# Patient Record
Sex: MALE | Race: WHITE | NOT HISPANIC OR LATINO | Employment: FULL TIME | URBAN - METROPOLITAN AREA
[De-identification: names, ages, dates, MRNs, and addresses within clinical notes are randomized per-mention and may not be internally consistent; named-entity substitution may affect disease eponyms.]

---

## 2017-11-22 PROCEDURE — 99283 EMERGENCY DEPT VISIT LOW MDM: CPT

## 2017-11-22 ASSESSMENT — PAIN SCALES - GENERAL
PAINLEVEL_OUTOF10: 4
PAINLEVEL_OUTOF10: 4

## 2017-11-23 ENCOUNTER — HOSPITAL ENCOUNTER (EMERGENCY)
Facility: MEDICAL CENTER | Age: 22
End: 2017-11-23
Attending: EMERGENCY MEDICINE
Payer: MEDICAID

## 2017-11-23 ENCOUNTER — APPOINTMENT (OUTPATIENT)
Dept: RADIOLOGY | Facility: MEDICAL CENTER | Age: 22
End: 2017-11-23
Attending: STUDENT IN AN ORGANIZED HEALTH CARE EDUCATION/TRAINING PROGRAM
Payer: MEDICAID

## 2017-11-23 VITALS
OXYGEN SATURATION: 95 % | DIASTOLIC BLOOD PRESSURE: 70 MMHG | WEIGHT: 234.57 LBS | BODY MASS INDEX: 32.84 KG/M2 | TEMPERATURE: 97.4 F | HEIGHT: 71 IN | SYSTOLIC BLOOD PRESSURE: 144 MMHG | RESPIRATION RATE: 18 BRPM | HEART RATE: 75 BPM

## 2017-11-23 DIAGNOSIS — N50.811 PAIN IN RIGHT TESTICLE: ICD-10-CM

## 2017-11-23 LAB
APPEARANCE UR: CLEAR
BILIRUB UR QL STRIP.AUTO: NEGATIVE
COLOR UR: YELLOW
CULTURE IF INDICATED INDCX: NO UA CULTURE
GLUCOSE UR STRIP.AUTO-MCNC: NEGATIVE MG/DL
KETONES UR STRIP.AUTO-MCNC: NEGATIVE MG/DL
LEUKOCYTE ESTERASE UR QL STRIP.AUTO: NEGATIVE
MICRO URNS: NORMAL
NITRITE UR QL STRIP.AUTO: NEGATIVE
PH UR STRIP.AUTO: 6.5 [PH]
PROT UR QL STRIP: NEGATIVE MG/DL
RBC UR QL AUTO: NEGATIVE
SP GR UR STRIP.AUTO: 1.02
UROBILINOGEN UR STRIP.AUTO-MCNC: 0.2 MG/DL

## 2017-11-23 PROCEDURE — 76870 US EXAM SCROTUM: CPT

## 2017-11-23 PROCEDURE — 81003 URINALYSIS AUTO W/O SCOPE: CPT

## 2017-11-23 NOTE — ED NOTES
"Chief Complaint   Patient presents with   • Testicle Pain     right testicle; pt states s/s started about year and half but past week pain has increased. Pt denies any discoloration or swelling.      Pt ambulatory to triage with above complaint. Pt states pain is usually a constant dull ache, with intermittent sharp pains.     /70   Pulse 92   Temp 36.2 °C (97.2 °F) (Temporal)   Resp 18   Ht 1.803 m (5' 11\")   Wt 106.4 kg (234 lb 9.1 oz)   SpO2 100%   BMI 32.72 kg/m²     Pt informed of triage process and encouraged to notify staff of any changes or concerns. Pt placed back in lobby.  "

## 2017-11-23 NOTE — ED NOTES
Reviewed discharge instructions, pt verbalized understanding of instructions. States he will schedule follow-up appointment with urology for cont pain. Denies further questions at this time. Pt ambulatory out of ER with stable gait.

## 2017-11-23 NOTE — ED PROVIDER NOTES
"CHIEF COMPLAINT  Chief Complaint   Patient presents with   • Testicle Pain     right testicle; pt states s/s started about year and half but past week pain has increased. Pt denies any discoloration or swelling.        HPI  Demar Gomez is a 22 y.o. male who presents with right testicular pain. He reports intermittent right testicular pain which last about 30 minutes for 1-1/2 year but has been recently worsening for the last week with dull aching pain in between the episodes, and the pain is usually worse with standing and sitting for long time. He denies scrotal swelling, fevers, nausea, vomiting, abdominal pain, loss of appetite or weight loss, penile discharge, burning micturition, frequency and urgency. But he acknowledges low sexual drive especially for the last month. The scrotal exam was negative for hydrocele, hernia, there might be small bilateral varicoceles.    REVIEW OF SYSTEMS  See HPI for further details. All other systems are negative.     PAST MEDICAL HISTORY       SOCIAL HISTORY  Social History     Social History Main Topics   • Smoking status: Not on file   • Smokeless tobacco: Not on file   • Alcohol use Not on file   • Drug use: Unknown   • Sexual activity: Not on file       SURGICAL HISTORY  patient denies any surgical history    CURRENT MEDICATIONS  Home Medications    **Home medications have not yet been reviewed for this encounter**         ALLERGIES  No Known Allergies    PHYSICAL EXAM  VITAL SIGNS: /70   Pulse 92   Temp 36.2 °C (97.2 °F) (Temporal)   Resp 18   Ht 1.803 m (5' 11\")   Wt 106.4 kg (234 lb 9.1 oz)   SpO2 100%   BMI 32.72 kg/m²   Pulse ox interpretation: I interpret this pulse ox as normal.  Constitutional: Alert in no apparent distress.  Cardiovascular: Regular rate and rhythm  Thorax & Lungs: No respiratory distress  Abdomen: Bowel sounds normal, Soft, No tenderness, No masses, No pulsatile masses. No peritoneal signs.  Skin: Warm, Dry, No erythema, No " "rash.   Extremities: Intact distal pulses, No edema, No tenderness, No cyanosis  The scrotal exam was negative for hernia, masses.  No erythema. No testicular swelling      DIAGNOSTIC STUDIES / PROCEDURES      LABS  Labs Reviewed   URINALYSIS,CULTURE IF INDICATED         RADIOLOGY  [id number]   Final Result      Unremarkable testes.      Small left epididymal head cyst.      No hernia seen.              COURSE & MEDICAL DECISION MAKING  Pertinent Labs & Imaging studies reviewed. (See chart for details)  2:50 AM patient was seen at bedside, patient was stable with normal vitals    3:00 a.m. scrotal ultrasound was ordered, scrotal exam was unremarkable Without significant deformity, mass, swelling, erythema.    Young male who presented to his chronic right testicular pain which has been recently worsening, he denies fever, swelling, scrotal exam was unremarkable and there was no evidence of testicular torsion. Scrotal ultrasound showed no obvious acute abnormalities to explain his symptoms. Patient was reassured. Supportive therapy was recommended. The patient will be discharged home with PCP follow up.     The patient will not drink alcohol nor drive with prescribed medications.   The patient will return for worsening symptoms or failure of improvement and is stable at the time of discharge. The patient verbalizes understanding in their own words.    /70   Pulse 75   Temp 36.3 °C (97.4 °F)   Resp 18   Ht 1.803 m (5' 11\")   Wt 106.4 kg (234 lb 9.1 oz)   SpO2 95%   BMI 32.72 kg/m²     The patient was referred to primary care where they will receive further BP management.     Pcp Pt States None    In 2 days      [facility], Emergency Dept  [address] [facility] [address] [phone]    As needed, If symptoms worsen    Randy Kendrick M.D.  [address]  [address]  [facility] [id number] [phone]      For urology follow-up if you have continued pain       FINAL IMPRESSION  1. " Pain in right testicle        ------------------------  I independently evaluated the patient and repeated the important components of the history and physical. I discussed the management with the resident. I have reviewed and agree with the pertinent clinical information above including history, exam, study findings, and recommendations. Briefly this is a 22-year-old male presenting with right testicular pain that is intermittent over the past year and a half. No dysuria or hematuria. No nausea or vomiting. No scrotal swelling. No erythema or skin changes. No penile discharge.    Unremarkable physical exam without any obvious testicular abnormalities on physical exam. Patient was evaluated directly by myself.    Urinalysis was unremarkable without evidence of infection. Ultrasound of the scrotum was unremarkable as well for acute findings to  The patient's symptoms at this time. Is instructed to follow-up with his primary care physician and internal medicine physician for further management. Discharged home in stable condition without active discomfort.    Electronically signed by: Tim Wang, 11/23/2017 7:47 AM

## 2018-02-07 ENCOUNTER — EH NON-PROVIDER (OUTPATIENT)
Dept: OCCUPATIONAL MEDICINE | Facility: CLINIC | Age: 23
End: 2018-02-07

## 2018-02-07 ENCOUNTER — EMPLOYEE HEALTH (OUTPATIENT)
Dept: OCCUPATIONAL MEDICINE | Facility: CLINIC | Age: 23
End: 2018-02-07

## 2018-02-07 ENCOUNTER — HOSPITAL ENCOUNTER (OUTPATIENT)
Facility: MEDICAL CENTER | Age: 23
End: 2018-02-07
Attending: PREVENTIVE MEDICINE
Payer: COMMERCIAL

## 2018-02-07 VITALS
WEIGHT: 234 LBS | HEART RATE: 65 BPM | SYSTOLIC BLOOD PRESSURE: 126 MMHG | RESPIRATION RATE: 16 BRPM | DIASTOLIC BLOOD PRESSURE: 88 MMHG | HEIGHT: 66 IN | OXYGEN SATURATION: 96 % | TEMPERATURE: 98.9 F | BODY MASS INDEX: 37.61 KG/M2

## 2018-02-07 DIAGNOSIS — Z02.1 PRE-EMPLOYMENT HEALTH SCREENING EXAMINATION: ICD-10-CM

## 2018-02-07 DIAGNOSIS — Z02.89 ENCOUNTER FOR OCCUPATIONAL HEALTH EXAMINATION: ICD-10-CM

## 2018-02-07 DIAGNOSIS — Z02.1 PRE-EMPLOYMENT DRUG SCREENING: ICD-10-CM

## 2018-02-07 LAB
AMP AMPHETAMINE: NORMAL
BAR BARBITURATES: NORMAL
BZO BENZODIAZEPINES: NORMAL
COC COCAINE: NORMAL
INT CON NEG: NORMAL
INT CON POS: NORMAL
MDMA ECSTASY: NORMAL
MET METHAMPHETAMINES: NORMAL
MTD METHADONE: NORMAL
OPI OPIATES: NORMAL
OXY OXYCODONE: NORMAL
PCP PHENCYCLIDINE: NORMAL
POC URINE DRUG SCREEN OCDRS: NORMAL
THC: NORMAL

## 2018-02-07 PROCEDURE — 90716 VAR VACCINE LIVE SUBQ: CPT | Performed by: PREVENTIVE MEDICINE

## 2018-02-07 PROCEDURE — 90686 IIV4 VACC NO PRSV 0.5 ML IM: CPT | Performed by: PREVENTIVE MEDICINE

## 2018-02-07 PROCEDURE — 94375 RESPIRATORY FLOW VOLUME LOOP: CPT | Performed by: PREVENTIVE MEDICINE

## 2018-02-07 PROCEDURE — 86480 TB TEST CELL IMMUN MEASURE: CPT | Performed by: PREVENTIVE MEDICINE

## 2018-02-07 PROCEDURE — 80305 DRUG TEST PRSMV DIR OPT OBS: CPT | Performed by: PREVENTIVE MEDICINE

## 2018-02-07 PROCEDURE — 8915 PR COMPREHENSIVE PHYSICAL: Performed by: PREVENTIVE MEDICINE

## 2018-02-09 LAB
M TB TUBERC IFN-G BLD QL: NEGATIVE
M TB TUBERC IFN-G/MITOGEN IGNF BLD: 0
M TB TUBERC IGNF/MITOGEN IGNF CONTROL: 50.69 [IU]/ML
MITOGEN IGNF BCKGRD COR BLD-ACNC: 0.02 [IU]/ML

## 2018-09-22 ENCOUNTER — HOSPITAL ENCOUNTER (OUTPATIENT)
Dept: LAB | Facility: MEDICAL CENTER | Age: 23
End: 2018-09-22
Payer: COMMERCIAL

## 2018-09-22 LAB
BDY FAT % MEASURED: 30.4 %
BP DIAS: 77 MMHG
BP SYS: 134 MMHG
CHOLEST SERPL-MCNC: 151 MG/DL (ref 100–199)
DIABETES HTDIA: NO
EVENT NAME HTEVT: NORMAL
FASTING HTFAS: YES
FASTING STATUS PATIENT QL REPORTED: NORMAL
GLUCOSE SERPL-MCNC: 93 MG/DL (ref 65–99)
HDLC SERPL-MCNC: 50 MG/DL
HYPERTENSION HTHYP: NO
LDLC SERPL CALC-MCNC: 91 MG/DL
SCREENING LOC CITY HTCIT: NORMAL
SCREENING LOC STATE HTSTA: NORMAL
SCREENING LOCATION HTLOC: NORMAL
SMOKING HTSMO: NO
SUBSCRIBER ID HTSID: NORMAL
TRIGL SERPL-MCNC: 52 MG/DL (ref 0–149)

## 2018-09-22 PROCEDURE — 80061 LIPID PANEL: CPT

## 2018-09-22 PROCEDURE — 82947 ASSAY GLUCOSE BLOOD QUANT: CPT

## 2018-09-22 PROCEDURE — S5190 WELLNESS ASSESSMENT BY NONPH: HCPCS

## 2018-09-22 PROCEDURE — 36415 COLL VENOUS BLD VENIPUNCTURE: CPT

## 2018-09-24 ENCOUNTER — IMMUNIZATION (OUTPATIENT)
Dept: OCCUPATIONAL MEDICINE | Facility: CLINIC | Age: 23
End: 2018-09-24

## 2018-09-24 DIAGNOSIS — Z23 NEED FOR VACCINATION: ICD-10-CM

## 2018-10-02 PROCEDURE — 90686 IIV4 VACC NO PRSV 0.5 ML IM: CPT | Performed by: PREVENTIVE MEDICINE

## 2018-12-07 ENCOUNTER — NON-PROVIDER VISIT (OUTPATIENT)
Dept: OCCUPATIONAL MEDICINE | Facility: CLINIC | Age: 23
End: 2018-12-07

## 2019-01-09 ENCOUNTER — OFFICE VISIT (OUTPATIENT)
Dept: MEDICAL GROUP | Age: 24
End: 2019-01-09
Payer: COMMERCIAL

## 2019-01-09 VITALS
DIASTOLIC BLOOD PRESSURE: 74 MMHG | HEIGHT: 66 IN | HEART RATE: 77 BPM | BODY MASS INDEX: 38.41 KG/M2 | WEIGHT: 239 LBS | TEMPERATURE: 98.2 F | OXYGEN SATURATION: 98 % | SYSTOLIC BLOOD PRESSURE: 128 MMHG

## 2019-01-09 DIAGNOSIS — F32.0 CURRENT MILD EPISODE OF MAJOR DEPRESSIVE DISORDER WITHOUT PRIOR EPISODE (HCC): ICD-10-CM

## 2019-01-09 DIAGNOSIS — E66.9 OBESITY (BMI 30-39.9): ICD-10-CM

## 2019-01-09 DIAGNOSIS — Z00.00 PREVENTATIVE HEALTH CARE: ICD-10-CM

## 2019-01-09 PROBLEM — F32.9 MAJOR DEPRESSIVE DISORDER: Status: ACTIVE | Noted: 2019-01-09

## 2019-01-09 PROCEDURE — 99204 OFFICE O/P NEW MOD 45 MIN: CPT | Performed by: FAMILY MEDICINE

## 2019-01-09 RX ORDER — ALPRAZOLAM 0.5 MG/1
TABLET ORAL
Qty: 15 TAB | Refills: 0 | Status: SHIPPED | OUTPATIENT
Start: 2019-01-09 | End: 2019-03-08

## 2019-01-09 RX ORDER — DULOXETIN HYDROCHLORIDE 20 MG/1
20 CAPSULE, DELAYED RELEASE ORAL DAILY
Qty: 90 CAP | Refills: 0 | Status: SHIPPED | OUTPATIENT
Start: 2019-01-09 | End: 2019-02-13

## 2019-01-09 ASSESSMENT — PATIENT HEALTH QUESTIONNAIRE - PHQ9
5. POOR APPETITE OR OVEREATING: 2 - MORE THAN HALF THE DAYS
SUM OF ALL RESPONSES TO PHQ QUESTIONS 1-9: 21
CLINICAL INTERPRETATION OF PHQ2 SCORE: 6

## 2019-01-09 NOTE — ASSESSMENT & PLAN NOTE
Patient is a 23-year-old male who presents to clinic to establish care he has a significant history of depression and anxiety.  He takes no prescription medications.  The patient denied any chest pain, no sob, no seaman, no  pnd, no orthopnea, no headache, no changes in vision, no numbness or tingling, no nausea, no diarrhea, no abdominal pain, no fevers, no chills, no bright red blood per rectum, no  difficulty urinating, no burning during micturition, no depressed mood, no other concerns.    Family History of Cancer--- NO    Family History of CAD--- PGF with MI at age unknown age    Occupation----patient transport supervisor    Exercise---not regularly

## 2019-01-09 NOTE — ASSESSMENT & PLAN NOTE
Patient is a 23-year-old male who states that he has had depressed mood and anxiety for many years.  He is never talk to anyone about this nor has he tried pharmacotherapy.  He states that he tends to worry about many little things for example he knows he has the changes while in his car he constantly thinks about it until it happens.  Sometimes he stays up at night worrying about the next day or getting to work on time taking care of the family.  This does lead to depressed mood, he is never attempted suicide, and he denies any suicidal homicidal ideations.  He has a wife and 8-month-old daughter who is very happy about.  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

## 2019-01-09 NOTE — PROGRESS NOTES
This medical record contains text that has been entered with the assistance of computer voice recognition and dictation software.  Therefore, it may contain unintended errors in text, spelling, punctuation, or grammar    Chief Complaint   Patient presents with   • [facility] Care         Demar Gomez is a 23 y.o. male here evaluation and management of: establish care      HPI:     Major depressive disorder  Patient is a 23-year-old male who states that he has had depressed mood and anxiety for many years.  He is never talk to anyone about this nor has he tried pharmacotherapy.  He states that he tends to worry about many little things for example he knows he has the changes while in his car he constantly thinks about it until it happens.  Sometimes he stays up at night worrying about the next day or getting to work on time taking care of the family.  This does lead to depressed mood, he is never attempted suicide, and he denies any suicidal homicidal ideations.  He has a wife and 8-month-old daughter who is very happy about.  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      Preventative health care  Patient is a 23-year-old male who presents to clinic to [facility] he has a significant history of depression and anxiety.  He takes no prescription medications.  The patient denied any chest pain, no sob, no seaman, no  pnd, no orthopnea, no headache, no changes in vision, no numbness or tingling, no nausea, no diarrhea, no abdominal pain, no fevers, no chills, no bright red blood per rectum, no  difficulty urinating, no burning during micturition, no depressed mood, no other concerns.    Family History of Cancer--- NO    Family History of CAD--- PGF with MI at age unknown age    Occupation----patient transport supervisor    Exercise---not regularly              Current medicines (including changes today)  Current Outpatient Prescriptions   Medication  "Sig Dispense Refill   • DULoxetine (CYMBALTA) 20 MG Cap DR Particles Take 1 Cap by mouth every day. 90 Cap 0   • ALPRAZolam (XANAX) 0.5 MG Tab Take one tab po q48 prn severe anxiety not for daily use 15 Tab 0     No current facility-administered medications for this visit.      He  has no past medical history on file.  He  has no past surgical history on file.  Social History   Substance Use Topics   • Smoking status: Former Smoker   • Smokeless tobacco: Never Used   • Alcohol use No     Social History     Social History Narrative   • No narrative on file     No family history on file.  No family status information on file.         ROS    Please see hpi     All other systems reviewed and are negative     Objective:     Blood pressure 128/74, pulse 77, temperature 36.8 °C (98.2 °F), temperature source Temporal, height 1.676 m (5' 6\"), weight 108.4 kg (239 lb), SpO2 98 %. Body mass index is 38.58 kg/m².  Physical Exam:    Constitutional: Alert, no distress.  Skin: Warm, dry, good turgor, no rashes in visible areas.  Eye: Equal, round and reactive, conjunctiva clear, lids normal.  ENMT: Lips without lesions, good dentition, oropharynx clear.  Neck: Trachea midline, no masses, no thyromegaly. No cervical or supraclavicular lymphadenopathy.  Respiratory: Unlabored respiratory effort, lungs clear to auscultation, no wheezes, no ronchi.  Cardiovascular: Normal S1, S2, no murmur, no edema.  Abdomen: Soft, non-tender, no masses, no hepatosplenomegaly.  Psych: Alert and oriented x3, normal affect and mood.          Assessment and Plan:   The following treatment plan was discussed      1. Preventative health care    Care has been established  We need baseline labs to establish a clinical profile  We reviewed USPSTF guidelinesRequested Medical records to be sent to us  Denies intimate partner viloence        2. Current mild episode of major depressive disorder without prior episode (HCC)    Meets DSM V criteria for depression " screen s, no SI, no HI, begin pharmacotherapy today, I recomended mental health consult  for CBT. Patient aware that it could take 4-6 wks for the medication to have an antidepressant effect.  All side effects explained, including increased risk of suicidal ideations.  Patient to RTC in 4 wks.      10TABS OF ATIVAN GIVEN PRN SEVERE ANXIETY   Instructed to make appointment with Mental Health        - DULoxetine (CYMBALTA) 20 MG Cap DR Particles; Take 1 Cap by mouth every day.  Dispense: 90 Cap; Refill: 0  - ALPRAZolam (XANAX) 0.5 MG Tab; Take one tab po q48 prn severe anxiety not for daily use  Dispense: 15 Tab; Refill: 0  - Patient has been identified as being depressed and appropriate orders and counseling have been given            Instructed to Follow up in clinic or ER for worsening symptoms, difficulty breathing, lack of expected recovery, or should new symptoms or problems arise.    Followup: Return in about 4 weeks (around 2/6/2019) for Reevaluation, Discussing tollerance and efficacy of medication.       Once again this medical record contains text that has been entered with the assistance of computer voice recognition and dictation software.  Therefore, it may contain unintended errors in text, spelling, punctuation, or grammar

## 2019-02-13 ENCOUNTER — OFFICE VISIT (OUTPATIENT)
Dept: MEDICAL GROUP | Age: 24
End: 2019-02-13
Payer: COMMERCIAL

## 2019-02-13 VITALS
HEART RATE: 77 BPM | SYSTOLIC BLOOD PRESSURE: 112 MMHG | WEIGHT: 242 LBS | OXYGEN SATURATION: 96 % | HEIGHT: 66 IN | TEMPERATURE: 98.4 F | BODY MASS INDEX: 38.89 KG/M2 | DIASTOLIC BLOOD PRESSURE: 78 MMHG

## 2019-02-13 DIAGNOSIS — F32.0 CURRENT MILD EPISODE OF MAJOR DEPRESSIVE DISORDER WITHOUT PRIOR EPISODE (HCC): ICD-10-CM

## 2019-02-13 DIAGNOSIS — E66.9 OBESITY (BMI 30-39.9): ICD-10-CM

## 2019-02-13 PROCEDURE — 99214 OFFICE O/P EST MOD 30 MIN: CPT | Performed by: FAMILY MEDICINE

## 2019-02-13 RX ORDER — DULOXETIN HYDROCHLORIDE 20 MG/1
40 CAPSULE, DELAYED RELEASE ORAL DAILY
Qty: 90 CAP | Refills: 1 | Status: SHIPPED | OUTPATIENT
Start: 2019-02-13 | End: 2019-06-05 | Stop reason: SDUPTHER

## 2019-02-13 RX ORDER — PHENTERMINE HYDROCHLORIDE 30 MG/1
30 CAPSULE ORAL EVERY MORNING
Qty: 30 CAP | Refills: 0 | Status: SHIPPED | OUTPATIENT
Start: 2019-02-13 | End: 2019-03-13 | Stop reason: SDUPTHER

## 2019-02-13 ASSESSMENT — PATIENT HEALTH QUESTIONNAIRE - PHQ9
5. POOR APPETITE OR OVEREATING: 3 - NEARLY EVERY DAY
SUM OF ALL RESPONSES TO PHQ QUESTIONS 1-9: 13
CLINICAL INTERPRETATION OF PHQ2 SCORE: 3

## 2019-02-13 NOTE — ASSESSMENT & PLAN NOTE
Patient states he has been exercising but still has difficulty losing weight.  He is ready to start pharmacotherapy for this.  He understands the risks of developing diabetes and other malignancies associated with obesity.

## 2019-02-13 NOTE — PROGRESS NOTES
This medical record contains text that has been entered with the assistance of computer voice recognition and dictation software. Therefore, it may contain unintended errors in text, spelling, punctuation or grammar.    Chief Complaint   Patient presents with   • Depression     follow up       Demar Gomez is a 23 y.o. male here evaluation and management.       HPI:     1. Obesity (BMI 30-39.9)    Patient states he has been exercising but still has difficulty losing weight.  He is ready to start pharmacotherapy for this.  He understands the risks of developing diabetes and other malignancies associated with obesity.    - Comp Metabolic Panel; Future  - CBC WITHOUT DIFFERENTIAL; Future    2. Current mild episode of major depressive disorder without prior episode (HCC)  uncontrolled--new exacerbation    Patient states that he has noticed that the duloxetine 20 mg did decrease overall anxiety however still not at optimal level of thinking.  He does have some depressed mood at times.  He is noticed that he increase his eating/binge eating at times as well.  This is also affecting his sleep.  He has a very supportive wife and 1-year-old daughter and overall is happy about the future.  Denies any suicidal or homicidal ideations.    Bipolar Screen:          Denied all of the following         -  inflated self esteem or grandiosity, decreased need for sleep, more talkative than usual, racing thoughts or flight of ideas, distractibility, increase in goal-directed activity, excessive involvement in pleasurable activities that have a high potential for painful consequences, such as spending money or sexual indiscretion.      - phentermine 30 MG capsule; Take 1 Cap by mouth every morning for 30 days.  Dispense: 30 Cap; Refill: 0    He was started on Cymbalta 20 mg once daily and Xanax 0.5 mg as needed for depression and anxiety.       Current medicines (including changes today)  Current Outpatient Prescriptions  "  Medication Sig Dispense Refill   • DULoxetine (CYMBALTA) 20 MG Cap DR Particles Take 2 Caps by mouth every day. 90 Cap 1   • phentermine 30 MG capsule Take 1 Cap by mouth every morning for 30 days. 30 Cap 0   • ALPRAZolam (XANAX) 0.5 MG Tab Take one tab po q48 prn severe anxiety not for daily use 15 Tab 0     No current facility-administered medications for this visit.      He  has no past medical history on file.  He  has no past surgical history on file.  Social History   Substance Use Topics   • Smoking status: Former Smoker   • Smokeless tobacco: Never Used   • Alcohol use No     Social History     Social History Narrative   • No narrative on file     No family history on file.  No family status information on file.         ROS    Please see HPI    All other systems reviewed and are negative.       Objective:     Blood pressure 112/78, pulse 77, temperature 36.9 °C (98.4 °F), temperature source Temporal, height 1.676 m (5' 6\"), weight 109.8 kg (242 lb), SpO2 96 %. Body mass index is 39.06 kg/m².  Physical Exam:    Constitutional: Alert, no distress.  Skin: Warm, dry, good turgor, no rashes in visible areas.  Eye: Equal, round and reactive, conjunctiva clear, lids normal.  ENMT: Lips without lesions, good dentition, oropharynx clear.  Neck: Trachea midline, no masses, no thyromegaly. No cervical or supraclavicular lymphadenopathy.  Respiratory: Unlabored respiratory effort, lungs clear to auscultation, no wheezes, no ronchi.  Cardiovascular: Normal S1, S2, no murmur, no edema.  Psych: Alert and oriented x3, normal affect and mood.      Assessment and Plan:   The following treatment plan was discussed:    1. Obesity (BMI 30-39.9)  We discussed all side effects of phentermine including but not limited to Hypertension, ischemia, palpitations, tachycardia, Dizziness, dysphoria, euphoria, headache, insomnia, overstimulation, psychosis, restlessness, Urticaria, Change in libido, Constipation, diarrhea, " gastrointestinal distress, unpleasant taste, xerostomi,  Impotence   Acquired valvular heart disease (regurgitant), primary pulmonary hypertension      - phentermine 30 MG capsule; Take 1 Cap by mouth every morning for 30 days.  Dispense: 30 Cap; Refill: 0  - Comp Metabolic Panel; Future  - CBC WITHOUT DIFFERENTIAL; Future    2. Current mild episode of major depressive disorder without prior episode (HCC)      Increase Cymbalta to 40 mg p.o. daily  Return to clinic in 4 weeks to discuss efficacy  He denies any suicidal homicidal ideations.    Other orders  - DULoxetine (CYMBALTA) 20 MG Cap DR Particles; Take 2 Caps by mouth every day.  Dispense: 90 Cap; Refill: 1           HEALTH MAINTENANCE:    Instructed to Follow up in clinic or ER for worsening symptoms, difficulty breathing, lack of expected recovery, or should new symptoms or problems arise.    Follow up: Return in about 4 weeks (around 3/13/2019) for Medication refill.       Once again this medical record contains text that has been entered with the assistance of computer voice recognition, dictation software, and medical scribes.  Therefore, it may contain unintended errors in text, spelling, punctuation or grammar.    Jeanette EASTON (Scribe), am scribing for, and in the presence of, Kaz Ford M.D.    Electronically signed by: Jeanette Romero (Scribe), 2/13/2019     Kaz EASTON M.D. personally performed the services described in this documentation, as scribed by Jeanette Romero in my presence, and it is both accurate and complete.

## 2019-02-13 NOTE — ASSESSMENT & PLAN NOTE
Patient states that he has noticed that the duloxetine 20 mg did decrease overall anxiety however still not at optimal level of thinking.  He does have some depressed mood at times.  He is noticed that he increase his eating/binge eating at times as well.  This is also affecting his sleep.  He has a very supportive wife and 1-year-old daughter and overall is happy about the future.  Denies any suicidal or homicidal ideations.    Bipolar Screen:          Denied all of the following         -  inflated self esteem or grandiosity, decreased need for sleep, more talkative than usual, racing thoughts or flight of ideas, distractibility, increase in goal-directed activity, excessive involvement in pleasurable activities that have a high potential for painful consequences, such as spending money or sexual indiscretion.

## 2019-03-12 ENCOUNTER — HOSPITAL ENCOUNTER (OUTPATIENT)
Dept: LAB | Facility: MEDICAL CENTER | Age: 24
End: 2019-03-12
Attending: FAMILY MEDICINE
Payer: COMMERCIAL

## 2019-03-12 DIAGNOSIS — E66.9 OBESITY (BMI 30-39.9): ICD-10-CM

## 2019-03-12 LAB
ALBUMIN SERPL BCP-MCNC: 4.4 G/DL (ref 3.2–4.9)
ALBUMIN/GLOB SERPL: 1.6 G/DL
ALP SERPL-CCNC: 87 U/L (ref 30–99)
ALT SERPL-CCNC: 27 U/L (ref 2–50)
ANION GAP SERPL CALC-SCNC: 12 MMOL/L (ref 0–11.9)
AST SERPL-CCNC: 30 U/L (ref 12–45)
BILIRUB SERPL-MCNC: 0.8 MG/DL (ref 0.1–1.5)
BUN SERPL-MCNC: 15 MG/DL (ref 8–22)
CALCIUM SERPL-MCNC: 9.6 MG/DL (ref 8.5–10.5)
CHLORIDE SERPL-SCNC: 108 MMOL/L (ref 96–112)
CO2 SERPL-SCNC: 23 MMOL/L (ref 20–33)
CREAT SERPL-MCNC: 1.03 MG/DL (ref 0.5–1.4)
ERYTHROCYTE [DISTWIDTH] IN BLOOD BY AUTOMATED COUNT: 42.1 FL (ref 35.9–50)
FASTING STATUS PATIENT QL REPORTED: NORMAL
GLOBULIN SER CALC-MCNC: 2.8 G/DL (ref 1.9–3.5)
GLUCOSE SERPL-MCNC: 86 MG/DL (ref 65–99)
HCT VFR BLD AUTO: 47.4 % (ref 42–52)
HGB BLD-MCNC: 15.7 G/DL (ref 14–18)
MCH RBC QN AUTO: 29.3 PG (ref 27–33)
MCHC RBC AUTO-ENTMCNC: 33.1 G/DL (ref 33.7–35.3)
MCV RBC AUTO: 88.4 FL (ref 81.4–97.8)
PLATELET # BLD AUTO: 358 K/UL (ref 164–446)
PMV BLD AUTO: 9.3 FL (ref 9–12.9)
POTASSIUM SERPL-SCNC: 4 MMOL/L (ref 3.6–5.5)
PROT SERPL-MCNC: 7.2 G/DL (ref 6–8.2)
RBC # BLD AUTO: 5.36 M/UL (ref 4.7–6.1)
SODIUM SERPL-SCNC: 143 MMOL/L (ref 135–145)
WBC # BLD AUTO: 9 K/UL (ref 4.8–10.8)

## 2019-03-12 PROCEDURE — 85027 COMPLETE CBC AUTOMATED: CPT

## 2019-03-12 PROCEDURE — 36415 COLL VENOUS BLD VENIPUNCTURE: CPT

## 2019-03-12 PROCEDURE — 80053 COMPREHEN METABOLIC PANEL: CPT

## 2019-03-13 ENCOUNTER — OFFICE VISIT (OUTPATIENT)
Dept: MEDICAL GROUP | Age: 24
End: 2019-03-13
Payer: COMMERCIAL

## 2019-03-13 VITALS
HEIGHT: 66 IN | HEART RATE: 72 BPM | SYSTOLIC BLOOD PRESSURE: 122 MMHG | WEIGHT: 228.2 LBS | DIASTOLIC BLOOD PRESSURE: 74 MMHG | TEMPERATURE: 97.7 F | BODY MASS INDEX: 36.67 KG/M2 | OXYGEN SATURATION: 98 %

## 2019-03-13 DIAGNOSIS — F32.5 MAJOR DEPRESSIVE DISORDER IN REMISSION, UNSPECIFIED WHETHER RECURRENT (HCC): ICD-10-CM

## 2019-03-13 DIAGNOSIS — Z23 NEED FOR VACCINATION: ICD-10-CM

## 2019-03-13 DIAGNOSIS — E66.9 OBESITY (BMI 30-39.9): ICD-10-CM

## 2019-03-13 PROCEDURE — 99213 OFFICE O/P EST LOW 20 MIN: CPT | Performed by: FAMILY MEDICINE

## 2019-03-13 RX ORDER — PHENTERMINE HYDROCHLORIDE 30 MG/1
30 CAPSULE ORAL EVERY MORNING
Qty: 30 CAP | Refills: 0 | Status: SHIPPED | OUTPATIENT
Start: 2019-03-13 | End: 2019-04-10 | Stop reason: SDUPTHER

## 2019-03-13 NOTE — PROGRESS NOTES
This medical record contains text that has been entered with the assistance of computer voice recognition and dictation software.  Therefore, it may contain unintended errors in text, spelling, punctuation, or grammar    Chief Complaint   Patient presents with   • Depression     med check follow-up, lab review         Demar Gomez is a 23 y.o. male here evaluation and management of: routine visit      HPI:     Obesity (BMI 30-39.9)  Patient is very excited about the results with phentermine.  He states it gave him more energy and controlled his appetite.  He has been using protein shakes in the morning big lunch and protein checks at night every other day.  He lost 16 pounds in 1 month and has not had any adverse events with phentermine.    Major depressive disorder  Overall doing much better on the duloxetine 40 mg p.o. Daily.    DEPRESSION SCREEN  Denied all of the following,  Depressed mood  Loss of interest or pleasure in nearly all activities  Changes in appetite or weight  Insomnia or hypersomnia,  Psychomotor agitation or retardation,  Fatigue or loss of energy,  Feelings of worthlessness or guilt,  Difficulty thinking, concentrating, or making decisions,  Recurrent thoughts of death or suicidal ideation, plans, or attempts   No Early Morning Awakenings    Current medicines (including changes today)  Current Outpatient Prescriptions   Medication Sig Dispense Refill   • phentermine 30 MG capsule Take 1 Cap by mouth every morning for 30 days. 30 Cap 0   • DULoxetine (CYMBALTA) 20 MG Cap DR Particles Take 2 Caps by mouth every day. 90 Cap 1     No current facility-administered medications for this visit.      He  has no past medical history on file.  He  has no past surgical history on file.  Social History   Substance Use Topics   • Smoking status: Former Smoker   • Smokeless tobacco: Never Used   • Alcohol use No     Social History     Social History Narrative   • No narrative on file     Family  "History   Problem Relation Age of Onset   • Cancer Paternal Grandmother      Family Status   Relation Status   • Mo Alive   • Fa Alive   • Sis Alive   • MGMo    • MGFa Alive   • PGMo Alive   • PGFa    • Sis Alive         ROS    Please see hpi     All other systems reviewed and are negative     Objective:     Blood pressure 122/74, pulse 72, temperature 36.5 °C (97.7 °F), height 1.676 m (5' 6\"), weight 103.5 kg (228 lb 3.2 oz), SpO2 98 %. Body mass index is 36.83 kg/m².  Physical Exam:    Constitutional: Alert, no distress.  Skin: Warm, dry, good turgor, no rashes in visible areas  Eye: Equal, round and reactive, conjunctiva clear, lids normal.  ENMT: Lips without lesions, good dentition, oropharynx clear.  Neck: Trachea midline, no masses, no thyromegaly. No cervical or supraclavicular lymphadenopathy.  Respiratory: Unlabored respiratory effort, lungs clear to auscultation, no wheezes, no ronchi.  Cardiovascular: Normal S1, S2, no murmur, no edema  Abdomen: Soft, non-tender, no masses, no hepatosplenomegaly.  Psych: Alert and oriented x3, normal affect and mood.          Assessment and Plan:   The following treatment plan was discussed      1. Need for vaccination  Left before getting  - Meningococcal Vaccine (IM) Group B    2. Obesity (BMI 30-39.9)      He lost 16 pounds  He would like to continue    We discussed all side effects of phentermine including but not limited to Hypertension, ischemia, palpitations, tachycardia, Dizziness, dysphoria, euphoria, headache, insomnia, overstimulation, psychosis, restlessness, Urticaria, Change in libido, Constipation, diarrhea, gastrointestinal distress, unpleasant taste, xerostomi,  Impotence   Acquired valvular heart disease (regurgitant), primary pulmonary hypertension      - phentermine 30 MG capsule; Take 1 Cap by mouth every morning for 30 days.  Dispense: 30 Cap; Refill: 0    3. Major depressive disorder in remission, unspecified whether recurrent " (HCC)  Patient has been stable with current management  We will make no changes for now        Instructed to Follow up in clinic or ER for worsening symptoms, difficulty breathing, lack of expected recovery, or should new symptoms or problems arise.    Followup: Return in about 4 weeks (around 4/10/2019) for Medication refill.       Once again this medical record contains text that has been entered with the assistance of computer voice recognition and dictation software.  Therefore, it may contain unintended errors in text, spelling, punctuation, or grammar

## 2019-03-13 NOTE — ASSESSMENT & PLAN NOTE
Patient is very excited about the results with phentermine.  He states it gave him more energy and controlled his appetite.  He has been using protein shakes in the morning big lunch and protein checks at night every other day.  He lost 16 pounds in 1 month and has not had any adverse events with phentermine.

## 2019-03-13 NOTE — ASSESSMENT & PLAN NOTE
Overall doing much better on the duloxetine 40 mg p.o. Daily.    DEPRESSION SCREEN  Denied all of the following,  Depressed mood  Loss of interest or pleasure in nearly all activities  Changes in appetite or weight  Insomnia or hypersomnia,  Psychomotor agitation or retardation,  Fatigue or loss of energy,  Feelings of worthlessness or guilt,  Difficulty thinking, concentrating, or making decisions,  Recurrent thoughts of death or suicidal ideation, plans, or attempts   No Early Morning Awakenings

## 2019-04-06 DIAGNOSIS — F32.0 CURRENT MILD EPISODE OF MAJOR DEPRESSIVE DISORDER WITHOUT PRIOR EPISODE (HCC): ICD-10-CM

## 2019-04-09 RX ORDER — DULOXETIN HYDROCHLORIDE 20 MG/1
CAPSULE, DELAYED RELEASE ORAL
Qty: 90 CAP | Refills: 0 | Status: SHIPPED | OUTPATIENT
Start: 2019-04-09 | End: 2019-07-01

## 2019-04-10 ENCOUNTER — OFFICE VISIT (OUTPATIENT)
Dept: MEDICAL GROUP | Age: 24
End: 2019-04-10
Payer: COMMERCIAL

## 2019-04-10 VITALS
DIASTOLIC BLOOD PRESSURE: 78 MMHG | OXYGEN SATURATION: 96 % | BODY MASS INDEX: 35.52 KG/M2 | TEMPERATURE: 97.9 F | HEART RATE: 93 BPM | HEIGHT: 66 IN | SYSTOLIC BLOOD PRESSURE: 110 MMHG | WEIGHT: 221 LBS

## 2019-04-10 DIAGNOSIS — E66.9 OBESITY (BMI 30-39.9): ICD-10-CM

## 2019-04-10 PROCEDURE — 99213 OFFICE O/P EST LOW 20 MIN: CPT | Performed by: FAMILY MEDICINE

## 2019-04-10 RX ORDER — PHENTERMINE HYDROCHLORIDE 30 MG/1
30 CAPSULE ORAL EVERY MORNING
Qty: 30 CAP | Refills: 0 | Status: SHIPPED
Start: 2019-04-10 | End: 2019-05-07 | Stop reason: SDUPTHER

## 2019-04-11 NOTE — PROGRESS NOTES
This medical record contains text that has been entered with the assistance of computer voice recognition and dictation software.  Therefore, it may contain unintended errors in text, spelling, punctuation, or grammar    Chief Complaint   Patient presents with   • Medication Refill     Phentermine         Demar Gomez is a 23 y.o. male here evaluation and management of: medication      HPI:     Obesity (BMI 30-39.9)  Overall patient is doing well on phentermine.  He is finished 2 months of 30 mg p.o. daily he is lost 1 pounds.  He has been using protein shakes as a meal replacement in the morning as well as for dinner.  He is having a large healthy lunch he states which usually includes solids.  States the the thought of binge eating has come back slowly.  He denies any abdominal pain no chest pain no palpitations no nausea no vomiting overall he is to fine.    Current medicines (including changes today)  Current Outpatient Prescriptions   Medication Sig Dispense Refill   • phentermine 30 MG capsule Take 1 Cap by mouth every morning for 30 days. 30 Cap 0   • DULoxetine (CYMBALTA) 20 MG Cap DR Particles TAKE 1 CAPSULE BY MOUTH EVERY DAY 90 Cap 0   • DULoxetine (CYMBALTA) 20 MG Cap DR Particles Take 2 Caps by mouth every day. 90 Cap 1     No current facility-administered medications for this visit.      He  has no past medical history on file.  He  has no past surgical history on file.  Social History   Substance Use Topics   • Smoking status: Former Smoker   • Smokeless tobacco: Never Used   • Alcohol use No     Social History     Social History Narrative   • No narrative on file     Family History   Problem Relation Age of Onset   • Cancer Paternal Grandmother      Family Status   Relation Status   • Mo Alive   • Fa Alive   • Sis Alive   • MGMo    • MGFa Alive   • PGMo Alive   • PGFa    • Sis Alive         ROS    Please see hpi     All other systems reviewed and are negative     Objective:  "    /78 (BP Location: Left arm, Patient Position: Sitting, BP Cuff Size: Adult)   Pulse 93   Temp 36.6 °C (97.9 °F) (Temporal)   Ht 1.676 m (5' 6\")   Wt 100.2 kg (221 lb)   SpO2 96%  Body mass index is 35.67 kg/m².  Physical Exam:    Constitutional: Alert, no distress.  Skin: Warm, dry, good turgor, no rashes in visible areas  Eye: Equal, round and reactive, conjunctiva clear, lids normal.  ENMT: Lips without lesions, good dentition, oropharynx clear.  Neck: Trachea midline, no masses, no thyromegaly. No cervical or supraclavicular lymphadenopathy.  Respiratory: Unlabored respiratory effort, lungs clear to auscultation, no wheezes, no ronchi.  Cardiovascular: Normal S1, S2, no murmur, no edema  Abdomen: Soft, non-tender, no masses, no hepatosplenomegaly.  Psych: Alert and oriented x3, normal affect and mood.          Assessment and Plan:   The following treatment plan was discussed      1. Obesity (BMI 30-39.9)    Lost 21 pounds in 2 months  And he has been tolerating the medication without any adverse events.  He would like to continue phentermine  We also discussed neuro plasticity and changing her thoughts in relationship with food  Begin exercising 30 minutes 3-4 times weekly    - phentermine 30 MG capsule; Take 1 Cap by mouth every morning for 30 days.  Dispense: 30 Cap; Refill: 0        Instructed to Follow up in clinic or ER for worsening symptoms, difficulty breathing, lack of expected recovery, or should new symptoms or problems arise.    Followup: Return in about 4 weeks (around 5/8/2019) for Medication refill.       Once again this medical record contains text that has been entered with the assistance of computer voice recognition and dictation software.  Therefore, it may contain unintended errors in text, spelling, punctuation, or grammar         "

## 2019-04-11 NOTE — ASSESSMENT & PLAN NOTE
Overall patient is doing well on phentermine.  He is finished 2 months of 30 mg p.o. daily he is lost 1 pounds.  He has been using protein shakes as a meal replacement in the morning as well as for dinner.  He is having a large healthy lunch he states which usually includes solids.  States the the thought of binge eating has come back slowly.  He denies any abdominal pain no chest pain no palpitations no nausea no vomiting overall he is to fine.

## 2019-04-17 ENCOUNTER — TELEPHONE (OUTPATIENT)
Dept: OCCUPATIONAL MEDICINE | Facility: CLINIC | Age: 24
End: 2019-04-17

## 2019-05-06 ENCOUNTER — OFFICE VISIT (OUTPATIENT)
Dept: URGENT CARE | Facility: CLINIC | Age: 24
End: 2019-05-06
Payer: COMMERCIAL

## 2019-05-06 VITALS
BODY MASS INDEX: 35.52 KG/M2 | SYSTOLIC BLOOD PRESSURE: 122 MMHG | TEMPERATURE: 98 F | RESPIRATION RATE: 14 BRPM | DIASTOLIC BLOOD PRESSURE: 84 MMHG | OXYGEN SATURATION: 97 % | WEIGHT: 221 LBS | HEART RATE: 109 BPM | HEIGHT: 66 IN

## 2019-05-06 DIAGNOSIS — L03.211 CELLULITIS OF FACE: ICD-10-CM

## 2019-05-06 PROCEDURE — 99214 OFFICE O/P EST MOD 30 MIN: CPT | Mod: 25 | Performed by: PHYSICIAN ASSISTANT

## 2019-05-06 RX ORDER — CEFTRIAXONE 1 G/1
1 INJECTION, POWDER, FOR SOLUTION INTRAMUSCULAR; INTRAVENOUS ONCE
Status: COMPLETED | OUTPATIENT
Start: 2019-05-06 | End: 2019-05-06

## 2019-05-06 RX ORDER — SULFAMETHOXAZOLE AND TRIMETHOPRIM 800; 160 MG/1; MG/1
1 TABLET ORAL 2 TIMES DAILY
Qty: 14 TAB | Refills: 0 | Status: SHIPPED | OUTPATIENT
Start: 2019-05-06 | End: 2019-05-13

## 2019-05-06 RX ADMIN — CEFTRIAXONE 1 G: 1 INJECTION, POWDER, FOR SOLUTION INTRAMUSCULAR; INTRAVENOUS at 18:49

## 2019-05-06 ASSESSMENT — ENCOUNTER SYMPTOMS
EDEMA: 1
FEVER: 0
COUGH: 0
PALPITATIONS: 0
SHORTNESS OF BREATH: 0
ROS SKIN COMMENTS: FACIAL SWELLING

## 2019-05-07 ENCOUNTER — OFFICE VISIT (OUTPATIENT)
Dept: MEDICAL GROUP | Age: 24
End: 2019-05-07
Payer: COMMERCIAL

## 2019-05-07 VITALS
WEIGHT: 217.8 LBS | TEMPERATURE: 97 F | OXYGEN SATURATION: 96 % | SYSTOLIC BLOOD PRESSURE: 104 MMHG | DIASTOLIC BLOOD PRESSURE: 66 MMHG | BODY MASS INDEX: 35 KG/M2 | HEART RATE: 89 BPM | HEIGHT: 66 IN

## 2019-05-07 DIAGNOSIS — L02.01 ABSCESS OF FACE: ICD-10-CM

## 2019-05-07 DIAGNOSIS — Z23 NEED FOR VACCINATION: ICD-10-CM

## 2019-05-07 DIAGNOSIS — L02.91 ABSCESS: ICD-10-CM

## 2019-05-07 DIAGNOSIS — E66.9 OBESITY (BMI 30-39.9): ICD-10-CM

## 2019-05-07 PROCEDURE — 99214 OFFICE O/P EST MOD 30 MIN: CPT | Mod: 25 | Performed by: FAMILY MEDICINE

## 2019-05-07 PROCEDURE — 90471 IMMUNIZATION ADMIN: CPT | Performed by: FAMILY MEDICINE

## 2019-05-07 PROCEDURE — 90621 MENB-FHBP VACC 2/3 DOSE IM: CPT | Performed by: FAMILY MEDICINE

## 2019-05-07 RX ORDER — PHENTERMINE HYDROCHLORIDE 30 MG/1
30 CAPSULE ORAL EVERY MORNING
Qty: 30 CAP | Refills: 0 | Status: SHIPPED | OUTPATIENT
Start: 2019-05-07 | End: 2019-06-06

## 2019-05-07 ASSESSMENT — PAIN SCALES - GENERAL: PAINLEVEL: 5=MODERATE PAIN

## 2019-05-07 NOTE — ASSESSMENT & PLAN NOTE
Starting weight 242lbs  After 3 months   todays wieght 217lbs  This far patient has lost 25 pounds.  He is interested in continuing phentermine as well as lifestyle modification.  He has not suffered any adverse side effects from the medication.  Denies any chest pain, no palpitations no back pain, no shortness of breath, no anxiety no sleeping issues no psychotic episodes.

## 2019-05-07 NOTE — LETTER
May 7, 2019       Patient: Demar Gomez   YOB: 1995   Date of Visit: 5/7/2019         To Whom It May Concern:    It is my medical opinion that Demar Gomez should be excused from work until Monday 13/May/2018    If you have any questions or concerns, please don't hesitate to call [phone]          Sincerely,          Kaz Jane M.D.  Electronically Signed

## 2019-05-07 NOTE — PROGRESS NOTES
"Subjective:      Demar Gomez is a 24 y.o. male who presents with Edema (LT lower cheek x today )            Edema   This is a new problem. The current episode started today. The problem occurs constantly. The problem has been rapidly worsening. Pertinent negatives include no chest pain, coughing, fever or rash. Associated symptoms comments: Swollen face. Nothing aggravates the symptoms. He has tried nothing for the symptoms.       Review of Systems   Constitutional: Negative for fever and malaise/fatigue.   Respiratory: Negative for cough and shortness of breath.    Cardiovascular: Negative for chest pain and palpitations.   Skin: Negative for itching and rash.        Facial swelling   All other systems reviewed and are negative.    PMH:  has no past medical history on file.  MEDS:   Current Outpatient Prescriptions:   •  sulfamethoxazole-trimethoprim (BACTRIM DS) 800-160 MG tablet, Take 1 Tab by mouth 2 times a day for 7 days., Disp: 14 Tab, Rfl: 0  •  phentermine 30 MG capsule, Take 1 Cap by mouth every morning for 30 days., Disp: 30 Cap, Rfl: 0  •  DULoxetine (CYMBALTA) 20 MG Cap DR Particles, TAKE 1 CAPSULE BY MOUTH EVERY DAY, Disp: 90 Cap, Rfl: 0  •  DULoxetine (CYMBALTA) 20 MG Cap DR Particles, Take 2 Caps by mouth every day. (Patient not taking: Reported on 5/6/2019), Disp: 90 Cap, Rfl: 1  ALLERGIES: No Known Allergies  SURGHX: History reviewed. No pertinent surgical history.  SOCHX:  reports that he has quit smoking. He has never used smokeless tobacco. He reports that he does not drink alcohol or use drugs.  FH: Family history was reviewed, no pertinent findings to report  Medications, Allergies, and current problem list reviewed today in Epic       Objective:     /84   Pulse (!) 109   Temp 36.7 °C (98 °F)   Resp 14   Ht 1.676 m (5' 6\")   Wt 100.2 kg (221 lb)   SpO2 97%   BMI 35.67 kg/m²      Physical Exam   Constitutional: He appears well-developed and well-nourished.   HENT:   "   Head:       Cardiovascular: Normal rate, regular rhythm and normal heart sounds.    Pulmonary/Chest: Effort normal and breath sounds normal.   Skin: Skin is warm and dry. There is erythema.   Psychiatric: He has a normal mood and affect. His behavior is normal. Judgment and thought content normal.   Vitals reviewed.              Assessment/Plan:     1. Cellulitis of face    - cefTRIAXone (ROCEPHIN) injection 1 g; 1,000 mg by Intramuscular route Once.  - sulfamethoxazole-trimethoprim (BACTRIM DS) 800-160 MG tablet; Take 1 Tab by mouth 2 times a day for 7 days.  Dispense: 14 Tab; Refill: 0    Differential diagnosis, natural history, supportive care discussed. Follow-up with primary care provider within 7-10 days, emergency room precautions discussed.  Patient and/or family appears understanding of information.  Handout and review of patients diagnosis and treatment was discussed extensively.

## 2019-05-07 NOTE — ASSESSMENT & PLAN NOTE
The patient was also seen yesterday in urgent care for left sided facial cellulitis.  He was given 1 g Rocephin injection and outpatient prescription for Bactrim DS twice daily for 7 days.  Overall nothing is changed because patient has not had a chance to  his antibiotics, he denies any discharge from the opening of the wound.  He denies any difficulty swallowing no shortness of breath no changes in vision.  He states that this swelling or redness started about a day ago, it was associated with redness warmth no fevers no chills but there was mild pain.

## 2019-05-07 NOTE — PROGRESS NOTES
This medical record contains text that has been entered with the assistance of computer voice recognition and dictation software.  Therefore, it may contain unintended errors in text, spelling, punctuation, or grammar    Chief Complaint   Patient presents with   • Facial Swelling     left side   • Follow-Up     1 Mo      Demar Gomez is a 24 y.o. male here evaluation and management of: weight loss medication, face infection      HPI:     Obesity (BMI 30-39.9)  Starting weight 242lbs  After 3 months   todays wieght 217lbs  This far patient has lost 25 pounds.  He is interested in continuing phentermine as well as lifestyle modification.  He has not suffered any adverse side effects from the medication.  Denies any chest pain, no palpitations no back pain, no shortness of breath, no anxiety no sleeping issues no psychotic episodes.     Abscess  The patient was also seen yesterday in urgent care for left sided facial cellulitis.  He was given 1 g Rocephin injection and outpatient prescription for Bactrim DS twice daily for 7 days.  Overall nothing is changed because patient has not had a chance to  his antibiotics, he denies any discharge from the opening of the wound.  He denies any difficulty swallowing no shortness of breath no changes in vision.  He states that this swelling or redness started about a day ago, it was associated with redness warmth no fevers no chills but there was mild pain.    Current medicines (including changes today)  Current Outpatient Prescriptions   Medication Sig Dispense Refill   • phentermine 30 MG capsule Take 1 Cap by mouth every morning for 30 days. 30 Cap 0   • sulfamethoxazole-trimethoprim (BACTRIM DS) 800-160 MG tablet Take 1 Tab by mouth 2 times a day for 7 days. 14 Tab 0   • DULoxetine (CYMBALTA) 20 MG Cap DR Particles TAKE 1 CAPSULE BY MOUTH EVERY DAY 90 Cap 0   • DULoxetine (CYMBALTA) 20 MG Cap DR Particles Take 2 Caps by mouth every day. (Patient not taking:  "Reported on 2019) 90 Cap 1     No current facility-administered medications for this visit.      He  has no past medical history on file.  He  has no past surgical history on file.  Social History   Substance Use Topics   • Smoking status: Former Smoker   • Smokeless tobacco: Never Used   • Alcohol use No     Social History     Social History Narrative   • No narrative on file     Family History   Problem Relation Age of Onset   • Cancer Paternal Grandmother      Family Status   Relation Status   • Mo Alive   • Fa Alive   • Sis Alive   • MGMo    • MGFa Alive   • PGMo Alive   • PGFa    • Sis Alive         ROS    Please see hpi     All other systems reviewed and are negative     Objective:     /66   Pulse 89   Temp 36.1 °C (97 °F) (Temporal)   Ht 1.676 m (5' 6\")   Wt 98.8 kg (217 lb 12.8 oz)   SpO2 96%  Body mass index is 35.15 kg/m².  Physical Exam:    Constitutional: Alert, no distress.  Skin: Left side of the face between the left nostril and masseter muscle there is swelling redness and greenish discharge.  It is warm and fluctuant.  Eye: Equal, round and reactive, conjunctiva clear, lids normal.  ENMT: Lips without lesions, good dentition, oropharynx clear.  Neck: Trachea midline, no masses, no thyromegaly. No cervical or supraclavicular lymphadenopathy.  Respiratory: Unlabored respiratory effort, lungs clear to auscultation, no wheezes, no ronchi.  Cardiovascular: Normal S1, S2, no murmur, no edema  Abdomen: Soft, non-tender, no masses, no hepatosplenomegaly.  Psych: Alert and oriented x3, normal affect and mood.          Assessment and Plan:   The following treatment plan was discussed    1. Obesity (BMI 30-39.9)    We discussed all side effects of phentermine including but not limited to Hypertension, ischemia, palpitations, tachycardia, Dizziness, dysphoria, euphoria, headache, insomnia, overstimulation, psychosis, restlessness, Urticaria, Change in libido, Constipation, " diarrhea, gastrointestinal distress, unpleasant taste, xerostomi,  Impotence   Acquired valvular heart disease (regurgitant), primary pulmonary hypertension      - phentermine 30 MG capsule; Take 1 Cap by mouth every morning for 30 days.  Dispense: 30 Cap; Refill: 0    3. Abscess    I think he will need Incision and drainage  In surgery, in addition to continuing the antibiotics  He was given infection/ER precautions.    - REFERRAL TO GENERAL SURGERY    3. Need for vaccination    Vaccine was administered today without adverse event.    - Meningococcal Vaccine (IM) Group B        Instructed to Follow up in clinic or ER for worsening symptoms, difficulty breathing, lack of expected recovery, or should new symptoms or problems arise.    Followup: Return in about 4 weeks (around 6/4/2019) for Medication refill.       Once again this medical record contains text that has been entered with the assistance of computer voice recognition and dictation software.  Therefore, it may contain unintended errors in text, spelling, punctuation, or grammar

## 2019-05-07 NOTE — PATIENT INSTRUCTIONS
Cellulitis, Adult  Cellulitis is a skin infection. The infected area is usually red and tender. This condition occurs most often in the arms and lower legs. The infection can travel to the muscles, blood, and underlying tissue and become serious. It is very important to get treated for this condition.  What are the causes?  Cellulitis is caused by bacteria. The bacteria enter through a break in the skin, such as a cut, burn, insect bite, open sore, or crack.  What increases the risk?  This condition is more likely to occur in people who:  · Have a weak defense system (immune system).  · Have open wounds on the skin such as cuts, burns, bites, and scrapes. Bacteria can enter the body through these open wounds.  · Are older.  · Have diabetes.  · Have a type of long-lasting (chronic) liver disease (cirrhosis) or kidney disease.  · Use IV drugs.  What are the signs or symptoms?  Symptoms of this condition include:  · Redness, streaking, or spotting on the skin.  · Swollen area of the skin.  · Tenderness or pain when an area of the skin is touched.  · Warm skin.  · Fever.  · Chills.  · Blisters.  How is this diagnosed?  This condition is diagnosed based on a medical history and physical exam. You may also have tests, including:  · Blood tests.  · Lab tests.  · Imaging tests.  How is this treated?  Treatment for this condition may include:  · Medicines, such as antibiotic medicines or antihistamines.  · Supportive care, such as rest and application of cold or warm cloths (cold or warm compresses) to the skin.  · Hospital care, if the condition is severe.  The infection usually gets better within 1-2 days of treatment.  Follow these instructions at home:  · Take over-the-counter and prescription medicines only as told by your health care provider.  · If you were prescribed an antibiotic medicine, take it as told by your health care provider. Do not stop taking the antibiotic even if you start to feel better.  · Drink  enough fluid to keep your urine clear or pale yellow.  · Do not touch or rub the infected area.  · Raise (elevate) the infected area above the level of your heart while you are sitting or lying down.  · Apply warm or cold compresses to the affected area as told by your health care provider.  · Keep all follow-up visits as told by your health care provider. This is important. These visits let your health care provider make sure a more serious infection is not developing.  Contact a health care provider if:  · You have a fever.  · Your symptoms do not improve within 1-2 days of starting treatment.  · Your bone or joint underneath the infected area becomes painful after the skin has healed.  · Your infection returns in the same area or another area.  · You notice a swollen bump in the infected area.  · You develop new symptoms.  · You have a general ill feeling (malaise) with muscle aches and pains.  Get help right away if:  · Your symptoms get worse.  · You feel very sleepy.  · You develop vomiting or diarrhea that persists.  · You notice red streaks coming from the infected area.  · Your red area gets larger or turns dark in color.  This information is not intended to replace advice given to you by your health care provider. Make sure you discuss any questions you have with your health care provider.  Document Released: 09/27/2006 Document Revised: 04/27/2017 Document Reviewed: 10/26/2016  [employer] Interactive Patient Education © 2017 [employer]

## 2019-06-04 ENCOUNTER — OFFICE VISIT (OUTPATIENT)
Dept: MEDICAL GROUP | Age: 24
End: 2019-06-04
Payer: COMMERCIAL

## 2019-06-04 VITALS
HEIGHT: 66 IN | BODY MASS INDEX: 35.03 KG/M2 | OXYGEN SATURATION: 97 % | DIASTOLIC BLOOD PRESSURE: 84 MMHG | WEIGHT: 218 LBS | HEART RATE: 82 BPM | SYSTOLIC BLOOD PRESSURE: 122 MMHG | TEMPERATURE: 97.6 F

## 2019-06-04 DIAGNOSIS — E66.9 OBESITY (BMI 30-39.9): ICD-10-CM

## 2019-06-04 PROCEDURE — 99213 OFFICE O/P EST LOW 20 MIN: CPT | Performed by: FAMILY MEDICINE

## 2019-06-04 ASSESSMENT — PAIN SCALES - GENERAL: PAINLEVEL: NO PAIN

## 2019-06-04 NOTE — ASSESSMENT & PLAN NOTE
The patient has finished 4 months of phentermine 30 mg p.o. daily his starting weight was 242 pounds today's weight is 218 pounds.  He states that he has reached the maximum benefit and in fact has gained 1 pound.  However he is interested in other medication that can help continue with his weight loss.  He tolerated phentermine without any adverse event, he denied any palpitations, no chest pain, no issues with high blood pressure, no anxiety.  He also denied any issues with insomnia.

## 2019-06-04 NOTE — PROGRESS NOTES
This medical record contains text that has been entered with the assistance of computer voice recognition and dictation software.  Therefore, it may contain unintended errors in text, spelling, punctuation, or grammar        Chief Complaint   Patient presents with   • Obesity     Fv         Demar Gomez is a 24 y.o. male here evaluation and management of: obesity      HPI:     1. Obesity (BMI 30-39.9)    The patient has finished 4 months of phentermine 30 mg p.o. daily his starting weight was 242 pounds today's weight is 218 pounds.  He states that he has reached the maximum benefit and in fact has gained 1 pound.  However he is interested in other medication that can help continue with his weight loss.  He tolerated phentermine without any adverse event, he denied any palpitations, no chest pain, no issues with high blood pressure, no anxiety.  He also denied any issues with insomnia.    Current medicines (including changes today)  Current Outpatient Prescriptions   Medication Sig Dispense Refill   • Phentermine-Topiramate (QSYMIA) 3.75-23 MG CAPSULE SR 24 HR Take 1 Capsule by mouth every 24 hours for 14 days. 14 Cap 0   • Phentermine-Topiramate (QSYMIA) 7.5-46 MG CAPSULE SR 24 HR Take 1 Capsule by mouth every 24 hours for 30 days. 14 Cap 0   • phentermine 30 MG capsule Take 1 Cap by mouth every morning for 30 days. 30 Cap 0   • DULoxetine (CYMBALTA) 20 MG Cap DR Particles Take 2 Caps by mouth every day. 90 Cap 1   • DULoxetine (CYMBALTA) 20 MG Cap DR Particles TAKE 1 CAPSULE BY MOUTH EVERY DAY 90 Cap 0     No current facility-administered medications for this visit.      He  has no past medical history on file.  He  has no past surgical history on file.  Social History   Substance Use Topics   • Smoking status: Former Smoker   • Smokeless tobacco: Never Used   • Alcohol use No     Social History     Social History Narrative   • No narrative on file     Family History   Problem Relation Age of Onset   •  "Cancer Paternal Grandmother      Family Status   Relation Status   • Mo Alive   • Fa Alive   • Sis Alive   • MGMo    • MGFa Alive   • PGMo Alive   • PGFa    • Sis Alive         ROS    The pertinent  ROS findings can be seen in the HPI above.     All other systems reviewed and are negative     Objective:     /84   Pulse 82   Temp 36.4 °C (97.6 °F) (Temporal)   Ht 1.676 m (5' 6\")   Wt 98.9 kg (218 lb)   SpO2 97%  Body mass index is 35.19 kg/m².  Physical Exam:    Constitutional: Alert, no distress.  Skin: no rashes,   Eye: Equal, round and reactive, conjunctiva clear, lids normal.  ENMT: Lips without lesions, good dentition, oropharynx clear.  Neck: Trachea midline, no masses, no thyromegaly. No cervical or supraclavicular lymphadenopathy.  Respiratory: Unlabored respiratory effort, lungs clear to auscultation, no wheezes, no ronchi.  Cardiovascular: Normal S1, S2, no murmur, no edema  Abdomen: Soft, non-tender, no masses, no hepatosplenomegaly.  Psych: Alert and oriented x3, normal affect and mood.          Assessment and Plan:   The following treatment plan was discussed      1. Obesity (BMI 30-39.9)    We reviewed the mechanism of action and all side effects of QSymia  He is certain he wants to give this new medication a fair college try  RTC in 30 days    - Phentermine-Topiramate (QSYMIA) 3.75-23 MG CAPSULE SR 24 HR; Take 1 Capsule by mouth every 24 hours for 14 days.  Dispense: 14 Cap; Refill: 0  - Phentermine-Topiramate (QSYMIA) 7.5-46 MG CAPSULE SR 24 HR; Take 1 Capsule by mouth every 24 hours for 30 days.  Dispense: 14 Cap; Refill: 0            Instructed to Follow up in clinic or ER for worsening symptoms, difficulty breathing, lack of expected recovery, or should new symptoms or problems arise.    Followup: Return in about 4 weeks (around 2019) for Medication refill.       Once again this medical record contains text that has been entered with the assistance of computer voice " recognition and dictation software.  Therefore, it may contain unintended errors in text, spelling, punctuation, or grammar

## 2019-06-05 DIAGNOSIS — F32.9 MAJOR DEPRESSIVE DISORDER, REMISSION STATUS UNSPECIFIED, UNSPECIFIED WHETHER RECURRENT: ICD-10-CM

## 2019-06-05 NOTE — TELEPHONE ENCOUNTER
Was the patient seen in the last year in this department? Yes    Does patient have an active prescription for medications requested? Yes    Received Request Via: Patient

## 2019-06-06 RX ORDER — DULOXETIN HYDROCHLORIDE 20 MG/1
40 CAPSULE, DELAYED RELEASE ORAL DAILY
Qty: 180 CAP | Refills: 1 | Status: SHIPPED | OUTPATIENT
Start: 2019-06-06 | End: 2019-07-01

## 2019-06-07 ENCOUNTER — HOSPITAL ENCOUNTER (OUTPATIENT)
Dept: LAB | Facility: MEDICAL CENTER | Age: 24
End: 2019-06-07
Payer: COMMERCIAL

## 2019-06-07 LAB
BDY FAT % MEASURED: 29.8 %
BP DIAS: 71 MMHG
BP SYS: 120 MMHG
CHOLEST SERPL-MCNC: 154 MG/DL (ref 100–199)
DIABETES HTDIA: NO
EVENT NAME HTEVT: NORMAL
FASTING HTFAS: YES
GLUCOSE SERPL-MCNC: 77 MG/DL (ref 65–99)
HDLC SERPL-MCNC: 58 MG/DL
HYPERTENSION HTHYP: NO
LDLC SERPL CALC-MCNC: 75 MG/DL
SCREENING LOC CITY HTCIT: NORMAL
SCREENING LOC STATE HTSTA: NORMAL
SCREENING LOCATION HTLOC: NORMAL
SMOKING HTSMO: NO
SUBSCRIBER ID HTSID: NORMAL
TRIGL SERPL-MCNC: 107 MG/DL (ref 0–149)

## 2019-06-07 PROCEDURE — 80061 LIPID PANEL: CPT

## 2019-06-07 PROCEDURE — 36415 COLL VENOUS BLD VENIPUNCTURE: CPT

## 2019-06-07 PROCEDURE — S5190 WELLNESS ASSESSMENT BY NONPH: HCPCS

## 2019-06-07 PROCEDURE — 82947 ASSAY GLUCOSE BLOOD QUANT: CPT

## 2019-07-01 ENCOUNTER — OFFICE VISIT (OUTPATIENT)
Dept: MEDICAL GROUP | Facility: MEDICAL CENTER | Age: 24
End: 2019-07-01
Payer: COMMERCIAL

## 2019-07-01 VITALS
OXYGEN SATURATION: 96 % | DIASTOLIC BLOOD PRESSURE: 78 MMHG | WEIGHT: 218 LBS | TEMPERATURE: 97.2 F | SYSTOLIC BLOOD PRESSURE: 126 MMHG | HEART RATE: 97 BPM | BODY MASS INDEX: 35.03 KG/M2 | HEIGHT: 66 IN

## 2019-07-01 DIAGNOSIS — E66.9 OBESITY (BMI 30-39.9): ICD-10-CM

## 2019-07-01 DIAGNOSIS — F41.1 GENERALIZED ANXIETY DISORDER: ICD-10-CM

## 2019-07-01 PROBLEM — Z00.00 PREVENTATIVE HEALTH CARE: Status: RESOLVED | Noted: 2019-01-09 | Resolved: 2019-07-01

## 2019-07-01 PROBLEM — L02.91 ABSCESS: Status: RESOLVED | Noted: 2019-05-07 | Resolved: 2019-07-01

## 2019-07-01 PROCEDURE — 99214 OFFICE O/P EST MOD 30 MIN: CPT | Performed by: FAMILY MEDICINE

## 2019-07-01 RX ORDER — FLUOXETINE 10 MG/1
10 CAPSULE ORAL DAILY
Qty: 30 CAP | Refills: 5 | Status: SHIPPED | OUTPATIENT
Start: 2019-07-01 | End: 2019-07-18 | Stop reason: SDUPTHER

## 2019-07-01 NOTE — PROGRESS NOTES
"Subjective:   Demar Gomez is a 24 y.o. male here today for anxiety and obesity    Patient is new to me, was seeing Dr. Ford previously.      Generalized anxiety disorder  Was having difficult dealing with stress and anxiety, has had a long history of anxiety, nervousness, jittery, bites fingernails. Was tried on duloxetine 20 mg, but made him really sleepy, taking 1-2 naps per day.  Patient's mother takes fluoxetine, which has been very beneficial to her.  Patient is interested in trying fluoxetine.    Obesity (BMI 30-39.9)  Was on phentermine, which helped him lose weight initially but caused him to be more anxious so he was switched Qsymia but did not work well for him to lose weight and was expensive.         Current medicines (including changes today)  Current Outpatient Prescriptions   Medication Sig Dispense Refill   • FLUoxetine (PROZAC) 10 MG Cap Take 1 Cap by mouth every day. 30 Cap 5     No current facility-administered medications for this visit.      He  has no past medical history on file.    ROS   No depression, + fatigue       Objective:     /78 (BP Location: Right arm, Patient Position: Sitting)   Pulse 97   Temp 36.2 °C (97.2 °F)   Ht 1.676 m (5' 6\")   Wt 98.9 kg (218 lb)   SpO2 96%  Body mass index is 35.19 kg/m².   Physical Exam:  Constitutional: Alert, no distress.  Skin: Warm, dry, good turgor, no rashes in visible areas.  Eye: Equal, round and reactive, conjunctiva clear, lids normal.  Respiratory: Unlabored respiratory effort, lungs clear to auscultation, no wheezes, no ronchi.  Cardiovascular: Normal S1, S2, no murmur, no edema.  Psych: Alert and oriented x3, normal affect and mood.        Assessment and Plan:   The following treatment plan was discussed    1. Generalized anxiety disorder  New problem to me.  Switch patient from duloxetine to fluoxetine because mother has responded well to fluoxetine.  Patient will message me in 1 month if dose needs to be " increased.  - FLUoxetine (PROZAC) 10 MG Cap; Take 1 Cap by mouth every day.  Dispense: 30 Cap; Refill: 5    2. Obesity (BMI 30-39.9)  Advised diet and exercise.  Patient is interested in getting off phentermine because phentermine increases his anxiety.  Referral to obesity medicine specialist, patient is interested in meal supplementation for weight loss.  - Patient identified as having weight management issue.  Appropriate orders and counseling given.  - REFERRAL TO [facility] (HIP) Services Requested: Physician Medical Weight Management Program; Reason for Referral? BMI>30; Reason for Visit: Overweight/Obesity      Followup: Return if symptoms worsen or fail to improve.       Total 25 minutes face-to-face time spent with patient, with greater than 50% of the total time discussing patient's issues and symptoms as listed above in assessment and plan, as well as managing coordination of care for future evaluation and treatment.

## 2019-07-01 NOTE — ASSESSMENT & PLAN NOTE
Was on phentermine, which helped him lose weight initially but caused him to be more anxious so he was switched Qsymia but did not work well for him to lose weight and was expensive.

## 2019-07-01 NOTE — ASSESSMENT & PLAN NOTE
Was having difficult dealing with stress and anxiety, has had a long history of anxiety, nervousness, jittery, bites fingernails. Was tried on duloxetine 20 mg, but made him really sleepy, taking 1-2 naps per day.  Patient's mother takes fluoxetine, which has been very beneficial to her.  Patient is interested in trying fluoxetine.

## 2019-07-03 ENCOUNTER — APPOINTMENT (OUTPATIENT)
Dept: MEDICAL GROUP | Age: 24
End: 2019-07-03

## 2019-07-18 ENCOUNTER — PATIENT MESSAGE (OUTPATIENT)
Dept: MEDICAL GROUP | Facility: MEDICAL CENTER | Age: 24
End: 2019-07-18

## 2019-07-18 DIAGNOSIS — F41.1 GENERALIZED ANXIETY DISORDER: ICD-10-CM

## 2019-07-18 RX ORDER — FLUOXETINE HYDROCHLORIDE 20 MG/1
20 CAPSULE ORAL DAILY
Qty: 90 CAP | Refills: 3 | Status: SHIPPED | OUTPATIENT
Start: 2019-07-18 | End: 2019-08-27 | Stop reason: SDUPTHER

## 2019-08-22 ENCOUNTER — APPOINTMENT (OUTPATIENT)
Dept: RADIOLOGY | Facility: IMAGING CENTER | Age: 24
End: 2019-08-22
Attending: PHYSICIAN ASSISTANT
Payer: COMMERCIAL

## 2019-08-22 ENCOUNTER — OFFICE VISIT (OUTPATIENT)
Dept: URGENT CARE | Facility: CLINIC | Age: 24
End: 2019-08-22
Payer: COMMERCIAL

## 2019-08-22 VITALS
TEMPERATURE: 98.6 F | HEART RATE: 85 BPM | HEIGHT: 66 IN | RESPIRATION RATE: 12 BRPM | DIASTOLIC BLOOD PRESSURE: 68 MMHG | OXYGEN SATURATION: 98 % | WEIGHT: 231.6 LBS | BODY MASS INDEX: 37.22 KG/M2 | SYSTOLIC BLOOD PRESSURE: 112 MMHG

## 2019-08-22 DIAGNOSIS — R07.9 CHEST PAIN, UNSPECIFIED TYPE: ICD-10-CM

## 2019-08-22 DIAGNOSIS — M25.512 ACUTE PAIN OF LEFT SHOULDER: ICD-10-CM

## 2019-08-22 DIAGNOSIS — R07.89 ANTERIOR CHEST WALL PAIN: ICD-10-CM

## 2019-08-22 PROCEDURE — 99213 OFFICE O/P EST LOW 20 MIN: CPT | Performed by: PHYSICIAN ASSISTANT

## 2019-08-22 PROCEDURE — 71046 X-RAY EXAM CHEST 2 VIEWS: CPT | Mod: TC | Performed by: PHYSICIAN ASSISTANT

## 2019-08-22 SDOH — HEALTH STABILITY: MENTAL HEALTH: HOW OFTEN DO YOU HAVE 6 OR MORE DRINKS ON ONE OCCASION?: WEEKLY

## 2019-08-22 ASSESSMENT — ENCOUNTER SYMPTOMS
EYE REDNESS: 0
FEVER: 0
SHORTNESS OF BREATH: 0
MYALGIAS: 0
DIZZINESS: 0
JOINT SWELLING: 0
NAUSEA: 0
EYE DISCHARGE: 0
ABDOMINAL PAIN: 0
HEADACHES: 0
IRREGULAR HEARTBEAT: 0
COUGH: 0
PALPITATIONS: 0
LOWER EXTREMITY EDEMA: 0
VOMITING: 0
SORE THROAT: 0

## 2019-08-22 NOTE — PROGRESS NOTES
Subjective:      Demar Gomez is a 24 y.o. male who presents with Chest Pain (x 1.5 days.  Pt. states that he is having stabbing pain.  He says it is sore like a bruise and hurts breathing in.  He fell down steps on last Sunday and pain started hurting 2 days after the fall. ) and Shoulder Pain (x 4 days.  Pt. fell down his stairs on last Sunday and landed on L shoulder.  He started having pain in the shoulder.)        Shoulder Pain   This is a new problem. Episode onset: x 4 days. The problem occurs constantly. The problem has been gradually improving. Associated symptoms include chest pain. Pertinent negatives include no abdominal pain, congestion, coughing, fever, headaches, joint swelling, myalgias, nausea, rash, sore throat or vomiting. Exacerbated by: movement. He has tried NSAIDs for the symptoms. The treatment provided mild relief.   Chest Pain    This is a new problem. Episode onset: x 1.5. The problem occurs constantly. The problem has been unchanged. Pain location: left sided. The pain is mild. The quality of the pain is described as sharp. The pain does not radiate. Pertinent negatives include no abdominal pain, cough, dizziness, fever, headaches, irregular heartbeat, lower extremity edema, nausea, palpitations, shortness of breath or vomiting. He has tried NSAIDs for the symptoms. The treatment provided mild relief.      The patient presents to clinic c/o left shoulder pain x 4 days secondary to a fall. The patient states he fell down the stairs 4 days ago injuring his left shoulder. No head injury. No LOC. The patient states he had limited movement of his left shoulder after the fall. He states this is gradually improving. The patient developed left sided chest pain x 1.5 days ago. The patient reports increased pain with movement and deep breathing. The patient denies shortness of breath, cough, leg swelling, nausea, palpitations, and fever. The patient states his chest pain feels muscular.  "The patient has taken NSAIDs for his symptoms with mild relief.     PMH:  has no past medical history on file.  MEDS:   Current Outpatient Medications:   •  FLUoxetine (PROZAC) 40 MG capsule, Take 1 Cap by mouth every day., Disp: 90 Cap, Rfl: 3  ALLERGIES: No Known Allergies  SURGHX: No past surgical history on file.  SOCHX:  reports that he quit smoking about 3 years ago. He has a 1.00 pack-year smoking history. He has never used smokeless tobacco. He reports that he drinks about 1.2 oz of alcohol per week. He reports that he does not use drugs.  FH: Family history was reviewed, no pertinent findings to report      Review of Systems   Constitutional: Negative for fever.   HENT: Negative for congestion, ear pain and sore throat.    Eyes: Negative for discharge and redness.   Respiratory: Negative for cough and shortness of breath.    Cardiovascular: Positive for chest pain. Negative for palpitations and leg swelling.   Gastrointestinal: Negative for abdominal pain, nausea and vomiting.   Genitourinary: Negative for dysuria.   Musculoskeletal: Positive for joint pain. Negative for joint swelling and myalgias.   Skin: Negative for rash.   Neurological: Negative for dizziness and headaches.          Objective:     /68   Pulse 85   Temp 37 °C (98.6 °F) (Temporal)   Resp 12   Ht 1.676 m (5' 6\")   Wt 105.1 kg (231 lb 9.6 oz)   SpO2 98%   BMI 37.38 kg/m²      Physical Exam   Constitutional: He is oriented to person, place, and time. He appears well-developed and well-nourished. No distress.   HENT:   Head: Normocephalic and atraumatic.   Right Ear: External ear normal.   Left Ear: External ear normal.   Nose: Nose normal.   Eyes: Conjunctivae and EOM are normal.   Neck: Normal range of motion. Neck supple.   Cardiovascular: Normal rate, regular rhythm and normal heart sounds.   Pulmonary/Chest: Effort normal and breath sounds normal.   Anterior Chest Wall:  Reproducible tenderness to the left anterior chest " wall. No ecchymosis. No crepitus. No swelling. No palpable deformity.        Musculoskeletal: Normal range of motion.   Left Shoulder:  No tenderness to palpation. No swelling. No ecchymosis.   Decreased ROM - the patient demonstrates decreased ROM in all planes secondary to pain   Neurovascular intact  Strength 5/5 - equal bilateral upper extremities   Neurological: He is alert and oriented to person, place, and time.   Skin: Skin is warm and dry.          Progress:  CXR:  COMPARISON:  None.    FINDINGS:    The cardiac silhouette  and mediastinal contours are normal.    No discrete opacity, pleural fluid or pneumothorax.    No suspicious bony lesions.          Impression       No acute cardiopulmonary findings.     EKG:  EKG Interpretation-HR is 70 normal EKG, normal sinus rhythm, there are no previous tracings available for comparison. No ST segment changes.     The patient declined an XR of his left shoulder at this time.      Assessment/Plan:     1. Acute pain of left shoulder    2. Anterior chest wall pain  - DX-CHEST-2 VIEWS; Future  - EKG - Clinic Performed    The patient's presenting symptoms and physical exam are consistent with left shoulder pain and anterior chest wall pain secondary to mechanical ground-level fall.  On physical exam, the patient had reproducible tenderness to his anterior chest wall without ecchymosis, crepitus, or palpable deformity.  The patient's chest x-ray today in clinic showed no acute cardiopulmonary findings. The patient's EKG showed normal sinus rhythm with no ST segment changes.  The patient's anterior chest wall pain likely musculoskeletal in nature given that it is worse with movement and reproducible on palpation.  On physical exam, the patient had no tenderness to palpation of his left shoulder. The patient declined an XR of his left shoulder at this time. The patient's left shoulder pain is likely due to an acute strain. The patient's vital signs are stable and within  normal limits. The patient appears in no acute distress. Recommend OTC medications and supportive care for symptomatic management. Discussed return precautions with the patient, and he verbalized understanding.     OTC NSAIDs for pain/discomfort  RICE  Follow-up with PCP  Return to clinic or go to the ED if symptoms worsen or fail to improve, or if the patient should develop worsening/increasing left shoulder pain, worsening/increasing anterior chest wall pain, shortness of breath, swelling, redness or warmth to the affected area, bruising, decreased range of motion, fever/chills, and/or any concerning symptoms.    Discussed plan with the patient, and he agrees to the above.

## 2019-09-30 ENCOUNTER — PATIENT MESSAGE (OUTPATIENT)
Dept: MEDICAL GROUP | Facility: MEDICAL CENTER | Age: 24
End: 2019-09-30

## 2019-10-01 ENCOUNTER — PATIENT MESSAGE (OUTPATIENT)
Dept: MEDICAL GROUP | Facility: MEDICAL CENTER | Age: 24
End: 2019-10-01

## 2019-10-01 RX ORDER — BUPROPION HYDROCHLORIDE 150 MG/1
150 TABLET, EXTENDED RELEASE ORAL 2 TIMES DAILY
Qty: 60 TAB | Refills: 5 | Status: SHIPPED | OUTPATIENT
Start: 2019-10-01 | End: 2020-01-28

## 2019-10-01 NOTE — TELEPHONE ENCOUNTER
From: Demar Gomez  To: Raman Mendoza M.D.  Sent: 10/1/2019 9:10 AM PDT  Subject: Prescription Question    Isiah Mendoza,    If you think it is a good idea i would like to give it a try  ----- Message -----  From: Raman Mendoza M.D.  Sent: 10/1/2019 7:23 AM PDT  To: Demar Gomez  Subject: RE: Prescription Question  Demar,    Thanks for the update. Sorry to hear about your focus and energy. Can we try to add Wellbutrin to the fluoxetine? The combination usually works really well especially with energy.    Dr. Mendoza      ----- Message -----   From: Demar Gomez   Sent: 9/30/2019 6:44 PM PDT   To: Raman Mendoza M.D.  Subject: Prescription Question    Isiah Mendoza,    I did not see the last message regarding the sleep test, but I wanted to update you on everything. So as for the medication the 40mg of fluoxetine, it did the same as the other two it worked for a little bit and now it doesn't I am always tired again i even started going to the gym in hopes to have more energy throughout the day and that didnt help. Honestly i just want to be able to keep focus and not be tired all day I have a kid and work and school work to keep up with and everytime this medication degrades like this it affects everything. If it means even stopping this medication and trying something else maybe im all for it it just need to be able to keep up with my life at this point.

## 2019-10-01 NOTE — TELEPHONE ENCOUNTER
From: Demar Gomez  To: Raman Mendoza M.D.  Sent: 9/30/2019 6:44 PM PDT  Subject: Prescription Question    Hey Dr Mendoza,    I did not see the last message regarding the sleep test, but I wanted to update you on everything. So as for the medication the 40mg of fluoxetine, it did the same as the other two it worked for a little bit and now it doesn't I am always tired again i even started going to the gym in hopes to have more energy throughout the day and that didnt help. Honestly i just want to be able to keep focus and not be tired all day I have a kid and work and school work to keep up with and everytime this medication degrades like this it affects everything. If it means even stopping this medication and trying something else maybe im all for it it just need to be able to keep up with my life at this point.

## 2019-10-22 ENCOUNTER — OFFICE VISIT (OUTPATIENT)
Dept: MEDICAL GROUP | Facility: MEDICAL CENTER | Age: 24
End: 2019-10-22
Payer: COMMERCIAL

## 2019-10-22 VITALS
SYSTOLIC BLOOD PRESSURE: 126 MMHG | WEIGHT: 227 LBS | HEART RATE: 77 BPM | DIASTOLIC BLOOD PRESSURE: 78 MMHG | HEIGHT: 66 IN | OXYGEN SATURATION: 99 % | BODY MASS INDEX: 36.48 KG/M2 | TEMPERATURE: 97.2 F

## 2019-10-22 DIAGNOSIS — Z23 NEED FOR VACCINATION: ICD-10-CM

## 2019-10-22 DIAGNOSIS — F41.1 GENERALIZED ANXIETY DISORDER: ICD-10-CM

## 2019-10-22 DIAGNOSIS — R53.82 CHRONIC FATIGUE: ICD-10-CM

## 2019-10-22 PROCEDURE — 99214 OFFICE O/P EST MOD 30 MIN: CPT | Mod: 25 | Performed by: FAMILY MEDICINE

## 2019-10-22 PROCEDURE — 90471 IMMUNIZATION ADMIN: CPT | Performed by: FAMILY MEDICINE

## 2019-10-22 PROCEDURE — 90686 IIV4 VACC NO PRSV 0.5 ML IM: CPT | Performed by: FAMILY MEDICINE

## 2019-10-22 NOTE — PROGRESS NOTES
"Subjective:   Demar Gomez is a 24 y.o. male here today for fatigue    We started on Wellbutrin  mg twice daily 2-3 weeks ago and added it to fluoxetine 40 mg daily.  His mood and anxiety have stabilized and are now well controlled.  Currently his biggest complaint is his fatigue.  He tried to discontinue fluoxetine and only take Wellbutrin but did not notice any difference in his energy level.  We will try to set up an overnight apnea link, but he was never contacted.  Patient sleeps 7 to 8 hours per night, he has a 1.5-year-old child that does wake up in the middle night sometimes.    Current medicines (including changes today)  Current Outpatient Medications   Medication Sig Dispense Refill   • sertraline (ZOLOFT) 50 MG Tab Take 1 Tab by mouth every day. 30 Tab 5   • buPROPion SR (WELLBUTRIN-SR) 150 MG TABLET SR 12 HR sustained-release tablet Take 1 Tab by mouth 2 times a day. 60 Tab 5     No current facility-administered medications for this visit.      He  has no past medical history on file.    ROS   No depression, no anxiety, no fever       Objective:     /78 (BP Location: Right arm, Patient Position: Sitting)   Pulse 77   Temp 36.2 °C (97.2 °F)   Ht 1.676 m (5' 6\")   Wt 103 kg (227 lb)   SpO2 99%  Body mass index is 36.64 kg/m².   Physical Exam:  Constitutional: Alert, no distress.  Skin: Warm, dry, good turgor, no rashes in visible areas.  Eye: Equal, round and reactive, conjunctiva clear, lids normal.  Psych: Alert and oriented x3, normal affect and mood.        Assessment and Plan:   The following treatment plan was discussed    1. Chronic fatigue  New problem.  We will check labs and resubmit an overnight apnea link.  - CBC WITH DIFFERENTIAL; Future  - Comp Metabolic Panel; Future  - TSH WITH REFLEX TO FT4; Future  - VITAMIN B12; Future  - TESTOSTERONE SERUM; Future    2. Generalized anxiety disorder  Controlled patient would like to change his fluoxetine, just to make sure " that is not contributing to fatigue.  Will start patient on sertraline 50 mg daily and continue Wellbutrin per his request.  - sertraline (ZOLOFT) 50 MG Tab; Take 1 Tab by mouth every day.  Dispense: 30 Tab; Refill: 5    3. Need for vaccination  - Influenza Vaccine Quad Injection (PF)        Followup: Return if symptoms worsen or fail to improve.

## 2019-11-11 ENCOUNTER — PATIENT MESSAGE (OUTPATIENT)
Dept: MEDICAL GROUP | Facility: MEDICAL CENTER | Age: 24
End: 2019-11-11

## 2019-11-11 DIAGNOSIS — F41.1 GENERALIZED ANXIETY DISORDER: ICD-10-CM

## 2019-11-11 DIAGNOSIS — F32.9 CURRENT EPISODE OF MAJOR DEPRESSIVE DISORDER WITHOUT PRIOR EPISODE, UNSPECIFIED DEPRESSION EPISODE SEVERITY: ICD-10-CM

## 2019-11-12 NOTE — TELEPHONE ENCOUNTER
From: Demar Gomez  To: Raman Mendoza M.D.  Sent: 11/11/2019 8:38 PM PST  Subject: Prescription Question    Hello,    I just wanted to keep you updated on my situation. The bupropion isnt working as well as i had hoped, I have been doing some digging and a friend of mine who works at [employer] that suffers from exhaustion suggested I should try adderall? I want to ask for your opinion because im not sure what it is either way I believe the bupropion is a waste and would like to drop it. However I am enjoying my new depression med you had me switch to. I think its working great!

## 2020-01-28 ENCOUNTER — OFFICE VISIT (OUTPATIENT)
Dept: BEHAVIORAL HEALTH | Facility: CLINIC | Age: 25
End: 2020-01-28
Payer: COMMERCIAL

## 2020-01-28 VITALS
WEIGHT: 253 LBS | HEIGHT: 66 IN | DIASTOLIC BLOOD PRESSURE: 90 MMHG | SYSTOLIC BLOOD PRESSURE: 130 MMHG | BODY MASS INDEX: 40.66 KG/M2

## 2020-01-28 DIAGNOSIS — F32.4 MAJOR DEPRESSIVE DISORDER WITH SINGLE EPISODE, IN PARTIAL REMISSION (HCC): ICD-10-CM

## 2020-01-28 PROCEDURE — 99203 OFFICE O/P NEW LOW 30 MIN: CPT | Mod: GC | Performed by: PSYCHIATRY & NEUROLOGY

## 2020-01-28 RX ORDER — BUPROPION HYDROCHLORIDE 300 MG/1
300 TABLET ORAL EVERY MORNING
Qty: 30 TAB | Refills: 3 | Status: SHIPPED | OUTPATIENT
Start: 2020-01-28 | End: 2020-02-12 | Stop reason: SDUPTHER

## 2020-01-28 NOTE — PROGRESS NOTES
PSYCHIATRIC CONSULTATION:  Psychiatric Supervising Attending: Sarah Calzada M.D.  Source of Information: Patient    Chief Complaint: fatigue    HPI:  Patient is a 24 y.o. male with history of depression, anxiety, fatigue who presented to the clinic for initial evaluation and medication management.  Patient denies SI/HI/AVH.  Able to verbalize appropriate safety plan including call 911 and go to hospital.  Patient reports that he has been experiencing depression and anxiety for a couple of years was first started on duloxetine which improved depression and anxiety but made fatigue worse.  Was switched to fluoxetine which had no benefit.  Switch to sertraline which had no benefit.  Currently on Wellbutrin 150 mg p.o. twice daily and believes it to be beneficial for depression and anxiety but fatigue still present.  Has been seeing primary care physician for evaluation of his fatigue.  No iron deficiencies or red blood cell deficiency.  Has not been sleep tested and thyroid testing has been ordered but patient has not completed lab work.  Patient reports sleeping 7 to 9 hours a night with a nap, no morning headaches, fatigue started several years ago.  Patient reports that he has struggled with his weight since the age of 12.  Significant other states that he snores has not noted that he stops breathing or gasping for air.  Verbally consents to switch to extended release Wellbutrin as evening dose Wellbutrin can impede sleep.  We will follow-up in 3 months after patient has done sleep testing, TSH, and engage in regular exercise.    Psychiatric ROS:  Depression: not depressed, no anhedonia, wieght/appetite changes, sleep disturbance, no psychomotor retardation, fatigue, no feelings of guilt/worthless/hopeless, no difficulty with concentration and no suicidal ideation  Ayanna: No signs or symptoms indicative of ayanna  Psychosis: Patient reports no signs or symptoms indicative of psychosis  Anxiety: No signs or symptoms  "indicative of anxiety  PTSD : Patient reports no signs or symptoms indicative of PTSD    MSE:  Vitals: /90   Ht 1.676 m (5' 6\")   Wt 114.8 kg (253 lb)   BMI 40.84 kg/m²   Musculoskeletal: No abnormal movements noted  Appearance: well-developed, obese and appropriately dressed, cooperative, pleasant and good eye contact  Language: Fluent  Speech: regular rate, rhythm, volume, tone, and syntax  Mood: \"good\"  Affect: euthymic, restricted and congruent with mood  Thought Process/Associations: linear, coherent, goal-oriented and organized  Thought Content: No overt delusions noted  SI/HI: Denies SI and HI  Perceptual Disturbances: Did not appear to be responding to internal stimuli  Cognition:   Orientation: Alert and oriented to place, person, date, situation   Attention: Grossly intact   Memory: no gross impairment in immediate, recent, or remote memory   Abstraction: No gross deficits   Fund of Knowledge: adequate  Insight: good  Judgment: good    Past Psych Hx:  Diagnoses: Patient reports the following previous diagnoses:, MDD, Anxiety  Inpatient: denies  Outpatient: denies  Medications: Patient trialed on the following previous medications: Duloxetine, fatigue, helped with depression and anxiety.  Fluoxetine, no benefit.  Sertraline, no benefit.  Phentermine, for weight loss, anxiety worse.  Xanax, used a couple times during phentermine use.  Currently on Wellbutrin beneficial for depression and anxiety.  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 with plan to get bachelors in business management at [employer] 6 years, getting  August 2020, good relationship  -2-year-old daughter  -Financially stable  -Works as supervisor patient " transport  -Lives in apartment with fiancé and daughter    Legal/Violence: Patient reports no pending legal issues    Substance Use:   Drugs: Patient denies drug use  Alcohol: Occasional, no heavy use  Tobacco: Patient denies the use of tobacco products    Medical Hx: As documented. All the vitals, labs, notes, records, problems and MAR reviewed.         Medical Conditions: History reviewed. No pertinent past medical history.  TBIs: denies  SZs: denies  Strokes: denies  Thyroid: denies  Diabetes: denies  Cardiovascular disease: denies  No Known Allergies    Medications:     Current Outpatient Medications:   •  buPROPion SR (WELLBUTRIN-SR) 150 MG TABLET SR 12 HR sustained-release tablet, Take 1 Tab by mouth 2 times a day., Disp: 60 Tab, Rfl: 5    Labs:                      [unfilled]  No results for input(s): HEPBQNT, [id number], RUBELLAIGG in the last 72 hours.    EKG: ECG not performed for this encounter    Medical Review of Symptoms:   Constitutional: Negative for fever, chills, weight loss  HENT: Negative for hearing loss, sore throat, neck pain  Eyes: Negative for blurred vision, pain, redness.   Respiratory: Negative for cough, shortness of breath, wheezing   Cardiovascular: Negative for chest pain, palpitations  Gastrointestinal: Negative for nausea, vomiting, diarrhea, constipation, blood in stool  Genitourinary: Negative for dysuria, urgency, frequency, hematuria  Musculoskeletal: Negative for myalgias,  joint pain  Skin: Negative for itching and rash.  Neurological: Negative for dizziness, tingling, tremors, weakness and headaches.   Psychiatric/Behavioral: See above for psych review of systems    Assessment/Formulation:   Patient is 24-year-old male who struggled with depression and anxiety in the past but currently well controlled on Wellbutrin.  Continues to struggle with fatigue from what she has struggled with for several years.  Will be engaging in sleep study, TSH, reports he will engage in regular  exercise.  We will follow-up in 3 months    Risk Assessment:  Acute danger to self: low as evidenced by the following: reports no current SI/intent/plans, reports no history of SA in past, currently recieving followup care, future oriented, goal oriented and able to verbalize several reasons to live  Chronic danger to self: elevated due to psychiatric illness  Danger to others: denies HI  Grave Disability: not applicable   Emergency plan reviewed: call 911 or report to ED if suicidal.    Diagnosis:  -Major depressive disorder, single episode, in partial remission     Plan:  -Reviewed risks, benefits, alternatives to include no treatment, therapy, observation only, and medications    -Reviewed safety plan, low risk, appropriate for continued outpatient management    -Verbally consented to Switch Wellbutrin  mg p.o. twice daily to Wellbutrin  mg p.o. daily  for depression  -Follow up in 3 months for re-eval and refill.  -The following was previously ordered by primary care: CBC, CMP, B12, TSH and Testosterone  -Recommending patient talk to primary care to be scheduled for sleep study  -Recommended patient engage in regular exercise  -Return to clinic placed for 4/28/2020, sooner if any issues      Patient seen and discussed with Dr Calzada

## 2020-02-12 ENCOUNTER — PATIENT MESSAGE (OUTPATIENT)
Dept: MEDICAL GROUP | Facility: MEDICAL CENTER | Age: 25
End: 2020-02-12

## 2020-02-12 DIAGNOSIS — F41.1 GENERALIZED ANXIETY DISORDER: ICD-10-CM

## 2020-02-12 RX ORDER — BUPROPION HYDROCHLORIDE 300 MG/1
300 TABLET ORAL EVERY MORNING
Qty: 90 TAB | Refills: 3 | Status: SHIPPED | OUTPATIENT
Start: 2020-02-12 | End: 2020-04-08

## 2020-02-12 NOTE — TELEPHONE ENCOUNTER
From: Demar Gomez  To: Raman Mendoza M.D.  Sent: 2/12/2020 9:44 AM PST  Subject: Prescription Question    Hey Dr. Mendoza  I discontinued all of my Depression medication because I felt as if I no longer needed them however after being off of them for around two or three weeks I feel the same imbalances that lead me to start them in the first place I was hoping I could start another prescription of them. Thoughts?

## 2020-04-03 ENCOUNTER — EH NON-PROVIDER (OUTPATIENT)
Dept: OCCUPATIONAL MEDICINE | Facility: CLINIC | Age: 25
End: 2020-04-03

## 2020-04-08 ENCOUNTER — OFFICE VISIT (OUTPATIENT)
Dept: MEDICAL GROUP | Age: 25
End: 2020-04-08
Payer: COMMERCIAL

## 2020-04-08 VITALS
HEART RATE: 75 BPM | WEIGHT: 256.2 LBS | SYSTOLIC BLOOD PRESSURE: 116 MMHG | TEMPERATURE: 98.3 F | BODY MASS INDEX: 40.21 KG/M2 | DIASTOLIC BLOOD PRESSURE: 72 MMHG | HEIGHT: 67 IN | OXYGEN SATURATION: 97 %

## 2020-04-08 DIAGNOSIS — F32.4 MAJOR DEPRESSIVE DISORDER WITH SINGLE EPISODE, IN PARTIAL REMISSION (HCC): ICD-10-CM

## 2020-04-08 DIAGNOSIS — Z23 NEED FOR VACCINATION: Primary | ICD-10-CM

## 2020-04-08 DIAGNOSIS — E66.9 OBESITY (BMI 30-39.9): ICD-10-CM

## 2020-04-08 PROCEDURE — 90471 IMMUNIZATION ADMIN: CPT | Performed by: FAMILY MEDICINE

## 2020-04-08 PROCEDURE — 90621 MENB-FHBP VACC 2/3 DOSE IM: CPT | Performed by: FAMILY MEDICINE

## 2020-04-08 PROCEDURE — 99214 OFFICE O/P EST MOD 30 MIN: CPT | Mod: 25 | Performed by: FAMILY MEDICINE

## 2020-04-08 RX ORDER — PHENTERMINE HYDROCHLORIDE 30 MG/1
30 CAPSULE ORAL EVERY MORNING
Qty: 30 CAP | Refills: 0 | Status: SHIPPED | OUTPATIENT
Start: 2020-04-08 | End: 2020-05-05 | Stop reason: SDUPTHER

## 2020-04-08 ASSESSMENT — PATIENT HEALTH QUESTIONNAIRE - PHQ9
8. MOVING OR SPEAKING SO SLOWLY THAT OTHER PEOPLE COULD HAVE NOTICED. OR THE OPPOSITE, BEING SO FIGETY OR RESTLESS THAT YOU HAVE BEEN MOVING AROUND A LOT MORE THAN USUAL: NOT AT ALL
5. POOR APPETITE OR OVEREATING: NOT AT ALL
3. TROUBLE FALLING OR STAYING ASLEEP OR SLEEPING TOO MUCH: NOT AT ALL
6. FEELING BAD ABOUT YOURSELF - OR THAT YOU ARE A FAILURE OR HAVE LET YOURSELF OR YOUR FAMILY DOWN: NOT AL ALL
2. FEELING DOWN, DEPRESSED, IRRITABLE, OR HOPELESS: NOT AT ALL
7. TROUBLE CONCENTRATING ON THINGS, SUCH AS READING THE NEWSPAPER OR WATCHING TELEVISION: NOT AT ALL
SUM OF ALL RESPONSES TO PHQ9 QUESTIONS 1 AND 2: 0
1. LITTLE INTEREST OR PLEASURE IN DOING THINGS: NOT AT ALL
SUM OF ALL RESPONSES TO PHQ QUESTIONS 1-9: 0
4. FEELING TIRED OR HAVING LITTLE ENERGY: NOT AT ALL
9. THOUGHTS THAT YOU WOULD BE BETTER OFF DEAD, OR OF HURTING YOURSELF: NOT AT ALL

## 2020-04-08 ASSESSMENT — FIBROSIS 4 INDEX: FIB4 SCORE: 0.35

## 2020-04-08 NOTE — PROGRESS NOTES
This medical record contains text that has been entered with the assistance of computer voice recognition and dictation software.  Therefore, it may contain unintended errors in text, spelling, punctuation, or grammar    Chief Complaint   Patient presents with   • Weight Loss     consult for phentramine         Demar Elvin Gomez is a 24 y.o. male with history of major depressive disorder here for the evaluation and management of:       HPI:     1. Obesity (BMI 30-39.9)  Chronic. Patient has gained 3 lbs in the past 3 months, currently 256 lbs. Patient has been trying to lose weight through balanced diet and regular exercise, however he's getting  2020 and is requesting to restart phentermine. He reports in the past he was on this medication for 4-5 months without side effect, and had 40 lb weight loss. BP normal in clinic today at 116/72.    2. Major depressive disorder with single episode, in partial remission (HCC)  Chronic. Patient reports compliancy with sertraline 50 mg QD and denies medication side effects. He reports he's gradually stopped taking bupropion  mg QD as he's found his mood to be stable and today he requests to discontinue bupropion permanently after finishing current prescription. He denies any HI, SI, plan, or self harm.     Current medicines (including changes today)  Current Outpatient Medications   Medication Sig Dispense Refill   • phentermine 30 MG capsule Take 1 Cap by mouth every morning for 30 days. 30 Cap 0   • sertraline (ZOLOFT) 50 MG Tab Take 1 Tab by mouth every day. 90 Tab 3     No current facility-administered medications for this visit.      He  has no past medical history on file.  He  has no past surgical history on file.  Social History     Tobacco Use   • Smoking status: Former Smoker     Packs/day: 0.50     Years: 2.00     Pack years: 1.00     Last attempt to quit: 2016     Years since quittin.2   • Smokeless tobacco: Never Used   Substance Use  "Topics   • Alcohol use: Yes     Alcohol/week: 1.2 oz     Types: 2 Cans of beer per week     Binge frequency: Weekly   • Drug use: No     Social History     Social History Narrative   • Not on file     Family History   Problem Relation Age of Onset   • Anxiety disorder Mother    • Cancer Paternal Grandmother      Family Status   Relation Name Status   • Mo  Alive   • Fa  Alive   • Sis  Alive   • MGMo     • MGFa  Alive   • PGMo  Alive   • PGFa     • Sis  Alive   • Sabrina  Alive         ROS    Please see hpi     All other systems reviewed and are negative     Objective:     /72 (BP Location: Left arm, Patient Position: Sitting, BP Cuff Size: Large adult)   Pulse 75   Temp 36.8 °C (98.3 °F) (Temporal)   Ht 1.702 m (5' 7\")   Wt 116.2 kg (256 lb 3.2 oz)   SpO2 97%  Body mass index is 40.13 kg/m².  Physical Exam:    Constitutional: Alert, no distress. Obese.  Skin: Dry, warm, no visible rashes  Eye: Equal, round and reactive, conjunctiva clear, lids normal.  ENMT: Lips without lesions, good dentition, oropharynx clear.  Neck: Trachea midline, no masses, no thyromegaly. No cervical or supraclavicular lymphadenopathy.  Respiratory: Unlabored respiratory effort, lungs clear to auscultation, no wheezes, no ronchi.  Cardiovascular: Normal S1, S2, no murmur, no edema.  Abdomen: Soft, non-tender, no masses, no hepatosplenomegaly.  Psych: Alert and oriented x3, normal affect and mood.      Assessment and Plan:   The following treatment plan was discussed:    1. Obesity (BMI 30-39.9)  - Chronic. Currently 256 lbs, unable to lose weight through conservative measures. Previously on phentermine for 4-5 months without side effect, and had 40 lb weight loss. Today I have restarted him on phentermine 30 mg QD. I reviewed proper use, potential risks, benefits, and side effects of this medication with the patient in clinic today.  - Patient informed he will have to return to clinic once a month for BP and weight " check. BP normal in clinic today at 116/72, weight 256 lbs.  - phentermine 30 MG capsule; Take 1 Cap by mouth every morning for 30 days.  Dispense: 30 Cap; Refill: 0    2. Major depressive disorder with single episode, in partial remission (HCC)  - Chronic. Patient advised to continue sertraline 50 mg QD. I reviewed potential risks, benefits, and side effects of this medication with the patient in clinic today.  - Patient feels mood has stabilized and he no longer requires bupropion  mg QD. At this time he is informed he may discontinue this medication.   - Patient denies SI, HI, plan, or self harm. Advised on emergent return precautions.    3. Need for vaccination  - Patient was agreeable to receiving the MCV vaccine in clinic today after discussion of potential risks, benefits, and side effects. Vaccine was administered without adverse effects.  - Meningococcal Vaccine Serogroup B 2-3 Dose (TRUMENBA)      HEALTH MAINTENANCE: Up to date.    Instructed to Follow up in clinic or ER for worsening symptoms, difficulty breathing, lack of expected recovery, or should new symptoms or problems arise.    Follow up: Return in about 4 weeks (around 5/6/2020) for Medication refill.      Once again this medical record contains text that has been entered with the assistance of computer voice recognition, dictation software, and medical scribes.  Therefore, it may contain unintended errors in text, spelling, punctuation, or grammar.    Cristin EASTON (Scribjuan carlos), am scribing for, and in the presence of, Kaz Ford M.D.    Electronically signed by: Cristin Paredes), 4/8/2020     IKaz M.D. personally performed the services described in this documentation, as scribed by Cristin Maguire in my presence, and it is both accurate and complete.

## 2020-05-05 ENCOUNTER — OFFICE VISIT (OUTPATIENT)
Dept: MEDICAL GROUP | Age: 25
End: 2020-05-05
Payer: COMMERCIAL

## 2020-05-05 VITALS
HEIGHT: 67 IN | DIASTOLIC BLOOD PRESSURE: 74 MMHG | BODY MASS INDEX: 38.71 KG/M2 | WEIGHT: 246.6 LBS | SYSTOLIC BLOOD PRESSURE: 118 MMHG | OXYGEN SATURATION: 96 % | TEMPERATURE: 98.1 F | HEART RATE: 91 BPM

## 2020-05-05 DIAGNOSIS — E66.9 OBESITY (BMI 30-39.9): ICD-10-CM

## 2020-05-05 PROCEDURE — 99212 OFFICE O/P EST SF 10 MIN: CPT | Performed by: FAMILY MEDICINE

## 2020-05-05 RX ORDER — PHENTERMINE HYDROCHLORIDE 30 MG/1
30 CAPSULE ORAL EVERY MORNING
Qty: 30 CAP | Refills: 0 | Status: SHIPPED | OUTPATIENT
Start: 2020-05-05 | End: 2020-06-17 | Stop reason: SDUPTHER

## 2020-05-05 ASSESSMENT — FIBROSIS 4 INDEX: FIB4 SCORE: 0.37

## 2020-05-05 NOTE — PROGRESS NOTES
This medical record contains text that has been entered with the assistance of computer voice recognition and dictation software.  Therefore, it may contain unintended errors in text, spelling, punctuation, or grammar      Chief Complaint   Patient presents with   • Weight Loss         Demra Gomez is a 25 y.o. male here evaluation and management of: Weight loss management      HPI:     1. Obesity (BMI 30-39.9)    The patient has done in the first month of phentermine.  He has lost 10 pounds since restarting phentermine, he has been tolerating the medication without any adverse events.  Blood pressure today is 118/74 he denies any anxiety no palpitations no difficulty with sleep.  He denies any chest pain, he has no history of cardiac valve pathology, he would like to continue phentermine.    In the past he lost 40 pounds using phentermine.    Current medicines (including changes today)  Current Outpatient Medications   Medication Sig Dispense Refill   • phentermine 30 MG capsule Take 1 Cap by mouth every morning for 30 days. 30 Cap 0   • sertraline (ZOLOFT) 50 MG Tab Take 1 Tab by mouth every day. 90 Tab 3     No current facility-administered medications for this visit.      He  has no past medical history on file.  He  has no past surgical history on file.  Social History     Tobacco Use   • Smoking status: Former Smoker     Packs/day: 0.50     Years: 2.00     Pack years: 1.00     Last attempt to quit: 2016     Years since quittin.3   • Smokeless tobacco: Never Used   Substance Use Topics   • Alcohol use: Yes     Alcohol/week: 1.2 oz     Types: 2 Cans of beer per week     Binge frequency: Weekly   • Drug use: No     Social History     Social History Narrative   • Not on file     Family History   Problem Relation Age of Onset   • Anxiety disorder Mother    • Cancer Paternal Grandmother      Family Status   Relation Name Status   • Mo  Alive   • Fa  Alive   • Sis  Alive   • MGMo     • MGFa  " Alive   • PGMo  Alive   • PGFa     • Sis  Alive   • Sabrina  Alive         ROS    The pertinent  ROS findings can be seen in the HPI above.     All other systems reviewed and are negative     Objective:     /74 (BP Location: Left arm, Patient Position: Sitting, BP Cuff Size: Adult)   Pulse 91   Temp 36.7 °C (98.1 °F) (Temporal)   Ht 1.702 m (5' 7\")   Wt 111.9 kg (246 lb 9.6 oz)   SpO2 96%  Body mass index is 38.62 kg/m².      Physical Exam:    Constitutional: Alert, no distress.  Skin: no rashes  Eye: Equal, round and reactive, conjunctiva clear, lids normal.  ENMT: Lips without lesions, good dentition, oropharynx clear.  Neck: Trachea midline, no masses, no thyromegaly. No cervical or supraclavicular lymphadenopathy.  Respiratory: Unlabored respiratory effort, lungs clear to auscultation, no wheezes, no ronchi.  Cardiovascular: Normal S1, S2, no murmur, no edema  Abdomen: Soft, non-tender, no masses, no hepatosplenomegaly.        Assessment and Plan:   The following treatment plan was discussed      1. Obesity (BMI 30-39.9)    Patient has been stable with current management  We will make no changes for now  We discussed all side effects of phentermine including but not limited to Hypertension, ischemia, palpitations, tachycardia, Dizziness, dysphoria, euphoria, headache, insomnia, overstimulation, psychosis, restlessness, Urticaria, Change in libido, Constipation, diarrhea, gastrointestinal distress, unpleasant taste, xerostomi,  Impotence   Acquired valvular heart disease (regurgitant), primary pulmonary hypertension      - phentermine 30 MG capsule; Take 1 Cap by mouth every morning for 30 days.  Dispense: 30 Cap; Refill: 0        Instructed to Follow up in clinic or ER for worsening symptoms, difficulty breathing, lack of expected recovery, or should new symptoms or problems arise.    Followup: Return in about 4 weeks (around 2020) for Medication refill.       Once again this medical record " contains text that has been entered with the assistance of computer voice recognition and dictation software.  Therefore, it may contain unintended errors in text, spelling, punctuation, or grammar

## 2020-06-17 ENCOUNTER — TELEMEDICINE (OUTPATIENT)
Dept: MEDICAL GROUP | Age: 25
End: 2020-06-17
Payer: COMMERCIAL

## 2020-06-17 VITALS
HEIGHT: 67 IN | TEMPERATURE: 98.3 F | DIASTOLIC BLOOD PRESSURE: 68 MMHG | SYSTOLIC BLOOD PRESSURE: 118 MMHG | BODY MASS INDEX: 38 KG/M2 | WEIGHT: 242.1 LBS

## 2020-06-17 DIAGNOSIS — Z00.00 ANNUAL PHYSICAL EXAM: ICD-10-CM

## 2020-06-17 DIAGNOSIS — E66.9 OBESITY (BMI 30-39.9): ICD-10-CM

## 2020-06-17 DIAGNOSIS — F32.4 MAJOR DEPRESSIVE DISORDER WITH SINGLE EPISODE, IN PARTIAL REMISSION (HCC): ICD-10-CM

## 2020-06-17 PROCEDURE — 99214 OFFICE O/P EST MOD 30 MIN: CPT | Mod: 95,CR | Performed by: FAMILY MEDICINE

## 2020-06-17 RX ORDER — SERTRALINE HYDROCHLORIDE 100 MG/1
100 TABLET, FILM COATED ORAL DAILY
Qty: 90 TAB | Refills: 0 | Status: SHIPPED | OUTPATIENT
Start: 2020-06-17 | End: 2020-10-13

## 2020-06-17 RX ORDER — PHENTERMINE HYDROCHLORIDE 30 MG/1
30 CAPSULE ORAL EVERY MORNING
Qty: 30 CAP | Refills: 0 | Status: SHIPPED | OUTPATIENT
Start: 2020-06-17 | End: 2020-07-17

## 2020-06-17 ASSESSMENT — FIBROSIS 4 INDEX: FIB4 SCORE: 0.37

## 2020-06-17 NOTE — PROGRESS NOTES
Telemedicine Visit: Established Patient     This encounter was conducted via [employer]   Verbal consent was obtained. Patient's identity was verified.    Subjective:   CC: several issues    Demar Gomez is a 25 y.o. male presenting for evaluation and management of:    1. Obesity (BMI 30-39.9)  While the patient has been doing well he is lost about 5 pounds in this past month.  However he is 241 pounds and he started with a weight of 256 pounds.  States he is tolerating the medication just fine no palpitations no chest pain no difficulty sleeping.    2. Major depressive disorder with single episode, in partial remission (HCC)  The patient states that the Zoloft 50 mg is not working he still feels a bit sad at times and is having difficulty sleeping.  He would like to increase the dose to 100 mg.  He denies any suicidal homicidal ideations.  He lives with his wife and 1-1/2-year-old daughter.    3. Annual physical exam  The patient would also like to order annual labs.    ROS   Denies any recent fevers or chills. No nausea or vomiting. No chest pains or shortness of breath.     No Known Allergies    Current medicines (including changes today)  Current Outpatient Medications   Medication Sig Dispense Refill   • sertraline (ZOLOFT) 100 MG Tab Take 1 Tab by mouth every day. 90 Tab 0   • phentermine 30 MG capsule Take 1 Cap by mouth every morning for 30 days. 30 Cap 0   • sertraline (ZOLOFT) 50 MG Tab Take 1 Tab by mouth every day. 90 Tab 3     No current facility-administered medications for this visit.        Patient Active Problem List    Diagnosis Date Noted   • Major depressive disorder with single episode, in partial remission (HCC) 01/28/2020   • Generalized anxiety disorder 01/09/2019   • Obesity (BMI 30-39.9) 01/09/2019       Family History   Problem Relation Age of Onset   • Anxiety disorder Mother    • Cancer Paternal Grandmother        He  has no past medical history on file.  He  has no past  "surgical history on file.       Objective:   /68 (BP Location: Left arm, Patient Position: Sitting, BP Cuff Size: Adult)   Temp 36.8 °C (98.3 °F) (Oral)   Ht 1.702 m (5' 7\")   Wt 109.8 kg (242 lb 1.6 oz)   BMI 37.92 kg/m²     Physical Exam:  Constitutional: Alert, no distress, well-groomed.  Skin: No rashes in visible areas.  Eye: Round. Conjunctiva clear, lids normal. No icterus.   ENMT: Lips pink without lesions, good dentition, moist mucous membranes. Phonation normal.  Neck: No masses, no thyromegaly. Moves freely without pain.  CV: Pulse as reported by patient  Respiratory: Unlabored respiratory effort, no cough or audible wheeze  Psych: Alert and oriented x3, normal affect and mood.       Assessment and Plan:   The following treatment plan was discussed:     1. Obesity (BMI 30-39.9)    We discussed all side effects of phentermine including but not limited to Hypertension, ischemia, palpitations, tachycardia, Dizziness, dysphoria, euphoria, headache, insomnia, overstimulation, psychosis, restlessness, Urticaria, Change in libido, Constipation, diarrhea, gastrointestinal distress, unpleasant taste, xerostomi,  Impotence   Acquired valvular heart disease (regurgitant), primary pulmonary hypertension        - phentermine 30 MG capsule; Take 1 Cap by mouth every morning for 30 days.  Dispense: 30 Cap; Refill: 0    2. Major depressive disorder with single episode, in partial remission (HCC)    Patient does not feel he is at his optimum level of feeling.  We will increase the sertraline to 100 mg and he understands it can take 4 to 6 weeks for this new dose to take therapeutic effect.    - sertraline (ZOLOFT) 100 MG Tab; Take 1 Tab by mouth every day.  Dispense: 90 Tab; Refill: 0    3. Annual physical exam    - Comp Metabolic Panel; Future  - CBC WITHOUT DIFFERENTIAL; Future  - Lipid Profile; Future  - TSH+FREE T4  - T3 FREE; Future  - VITAMIN D,25 HYDROXY; Future  - TESTOSTERONE, FREE AND TOTAL; " Future    Other orders  - sertraline (ZOLOFT) 100 MG Tab; Take 1 Tab by mouth every day.  Dispense: 90 Tab; Refill: 0        Follow-up: Return in about 4 weeks (around 7/15/2020) for Medication refill.

## 2020-07-29 ENCOUNTER — HOSPITAL ENCOUNTER (OUTPATIENT)
Dept: LAB | Facility: MEDICAL CENTER | Age: 25
End: 2020-07-29
Attending: FAMILY MEDICINE
Payer: COMMERCIAL

## 2020-07-29 DIAGNOSIS — Z00.00 ANNUAL PHYSICAL EXAM: ICD-10-CM

## 2020-07-29 LAB
ALBUMIN SERPL BCP-MCNC: 4.6 G/DL (ref 3.2–4.9)
ALBUMIN/GLOB SERPL: 1.7 G/DL
ALP SERPL-CCNC: 126 U/L (ref 30–99)
ALT SERPL-CCNC: 19 U/L (ref 2–50)
ANION GAP SERPL CALC-SCNC: 11 MMOL/L (ref 7–16)
AST SERPL-CCNC: 16 U/L (ref 12–45)
BILIRUB SERPL-MCNC: 0.4 MG/DL (ref 0.1–1.5)
BUN SERPL-MCNC: 16 MG/DL (ref 8–22)
CALCIUM SERPL-MCNC: 9.6 MG/DL (ref 8.5–10.5)
CHLORIDE SERPL-SCNC: 100 MMOL/L (ref 96–112)
CHOLEST SERPL-MCNC: 168 MG/DL (ref 100–199)
CO2 SERPL-SCNC: 25 MMOL/L (ref 20–33)
CREAT SERPL-MCNC: 0.74 MG/DL (ref 0.5–1.4)
ERYTHROCYTE [DISTWIDTH] IN BLOOD BY AUTOMATED COUNT: 39.6 FL (ref 35.9–50)
GLOBULIN SER CALC-MCNC: 2.7 G/DL (ref 1.9–3.5)
GLUCOSE SERPL-MCNC: 85 MG/DL (ref 65–99)
HCT VFR BLD AUTO: 49.2 % (ref 42–52)
HDLC SERPL-MCNC: 51 MG/DL
HGB BLD-MCNC: 16.9 G/DL (ref 14–18)
LDLC SERPL CALC-MCNC: 91 MG/DL
MCH RBC QN AUTO: 29.3 PG (ref 27–33)
MCHC RBC AUTO-ENTMCNC: 34.3 G/DL (ref 33.7–35.3)
MCV RBC AUTO: 85.4 FL (ref 81.4–97.8)
PLATELET # BLD AUTO: 361 K/UL (ref 164–446)
PMV BLD AUTO: 8.7 FL (ref 9–12.9)
POTASSIUM SERPL-SCNC: 4.2 MMOL/L (ref 3.6–5.5)
PROT SERPL-MCNC: 7.3 G/DL (ref 6–8.2)
RBC # BLD AUTO: 5.76 M/UL (ref 4.7–6.1)
SODIUM SERPL-SCNC: 136 MMOL/L (ref 135–145)
TRIGL SERPL-MCNC: 131 MG/DL (ref 0–149)
WBC # BLD AUTO: 8.5 K/UL (ref 4.8–10.8)

## 2020-07-29 PROCEDURE — 84270 ASSAY OF SEX HORMONE GLOBUL: CPT

## 2020-07-29 PROCEDURE — 84403 ASSAY OF TOTAL TESTOSTERONE: CPT

## 2020-07-29 PROCEDURE — 84402 ASSAY OF FREE TESTOSTERONE: CPT

## 2020-07-29 PROCEDURE — 36415 COLL VENOUS BLD VENIPUNCTURE: CPT

## 2020-07-29 PROCEDURE — 85027 COMPLETE CBC AUTOMATED: CPT

## 2020-07-29 PROCEDURE — 80061 LIPID PANEL: CPT

## 2020-07-29 PROCEDURE — 84443 ASSAY THYROID STIM HORMONE: CPT

## 2020-07-29 PROCEDURE — 84439 ASSAY OF FREE THYROXINE: CPT

## 2020-07-29 PROCEDURE — 82306 VITAMIN D 25 HYDROXY: CPT

## 2020-07-29 PROCEDURE — 84481 FREE ASSAY (FT-3): CPT

## 2020-07-29 PROCEDURE — 80053 COMPREHEN METABOLIC PANEL: CPT

## 2020-07-30 LAB
25(OH)D3 SERPL-MCNC: 37 NG/ML (ref 30–100)
T3FREE SERPL-MCNC: 3.81 PG/ML (ref 2–4.4)
T4 FREE SERPL-MCNC: 1 NG/DL (ref 0.93–1.7)
TSH SERPL DL<=0.005 MIU/L-ACNC: 1.38 UIU/ML (ref 0.38–5.33)

## 2020-07-31 LAB
SHBG SERPL-SCNC: 32 NMOL/L (ref 11–80)
TESTOST FREE MFR SERPL: 1.9 % (ref 1.6–2.9)
TESTOST FREE SERPL-MCNC: 85 PG/ML (ref 47–244)
TESTOST SERPL-MCNC: 446 NG/DL (ref 300–1080)

## 2020-08-11 ENCOUNTER — PATIENT MESSAGE (OUTPATIENT)
Dept: MEDICAL GROUP | Facility: MEDICAL CENTER | Age: 25
End: 2020-08-11

## 2020-08-11 DIAGNOSIS — Z00.00 ANNUAL PHYSICAL EXAM: ICD-10-CM

## 2020-10-02 ENCOUNTER — IMMUNIZATION (OUTPATIENT)
Dept: OCCUPATIONAL MEDICINE | Facility: CLINIC | Age: 25
End: 2020-10-02

## 2020-10-02 DIAGNOSIS — Z23 NEED FOR VACCINATION: ICD-10-CM

## 2020-10-02 PROCEDURE — 90686 IIV4 VACC NO PRSV 0.5 ML IM: CPT | Performed by: NURSE PRACTITIONER

## 2020-10-11 DIAGNOSIS — F32.4 MAJOR DEPRESSIVE DISORDER WITH SINGLE EPISODE, IN PARTIAL REMISSION (HCC): ICD-10-CM

## 2020-10-12 ENCOUNTER — EH NON-PROVIDER (OUTPATIENT)
Dept: OCCUPATIONAL MEDICINE | Facility: CLINIC | Age: 25
End: 2020-10-12

## 2020-10-12 ENCOUNTER — HOSPITAL ENCOUNTER (OUTPATIENT)
Facility: MEDICAL CENTER | Age: 25
End: 2020-10-12
Attending: PREVENTIVE MEDICINE
Payer: COMMERCIAL

## 2020-10-12 DIAGNOSIS — Z11.59 ENCOUNTER FOR SCREENING FOR OTHER VIRAL DISEASES: ICD-10-CM

## 2020-10-12 PROCEDURE — U0003 INFECTIOUS AGENT DETECTION BY NUCLEIC ACID (DNA OR RNA); SEVERE ACUTE RESPIRATORY SYNDROME CORONAVIRUS 2 (SARS-COV-2) (CORONAVIRUS DISEASE [COVID-19]), AMPLIFIED PROBE TECHNIQUE, MAKING USE OF HIGH THROUGHPUT TECHNOLOGIES AS DESCRIBED BY CMS-2020-01-R: HCPCS | Performed by: PREVENTIVE MEDICINE

## 2020-10-13 LAB
COVID ORDER STATUS COVID19: NORMAL
SARS-COV-2 RNA RESP QL NAA+PROBE: NOTDETECTED
SPECIMEN SOURCE: NORMAL

## 2020-10-13 RX ORDER — SERTRALINE HYDROCHLORIDE 100 MG/1
TABLET, FILM COATED ORAL
Qty: 90 TAB | Refills: 0 | Status: SHIPPED | OUTPATIENT
Start: 2020-10-13 | End: 2021-01-20

## 2020-10-14 ENCOUNTER — TELEPHONE (OUTPATIENT)
Dept: OCCUPATIONAL MEDICINE | Facility: CLINIC | Age: 25
End: 2020-10-14

## 2020-10-14 NOTE — TELEPHONE ENCOUNTER
Phone Number Called: [phone] (home)       Call outcome: Left detailed message for patient. Informed to call back with any additional questions.    Message: COVID results are negative. Patient was told to follow up with ext [phone] at this time for further clearance.

## 2020-11-27 ENCOUNTER — HOSPITAL ENCOUNTER (OUTPATIENT)
Facility: MEDICAL CENTER | Age: 25
End: 2020-11-27
Attending: EMERGENCY MEDICINE
Payer: COMMERCIAL

## 2020-11-27 ENCOUNTER — EH NON-PROVIDER (OUTPATIENT)
Dept: OCCUPATIONAL MEDICINE | Facility: CLINIC | Age: 25
End: 2020-11-27

## 2020-11-27 DIAGNOSIS — Z11.59 SPECIAL SCREENING EXAMINATION FOR VIRAL DISEASE: ICD-10-CM

## 2020-11-27 PROCEDURE — U0003 INFECTIOUS AGENT DETECTION BY NUCLEIC ACID (DNA OR RNA); SEVERE ACUTE RESPIRATORY SYNDROME CORONAVIRUS 2 (SARS-COV-2) (CORONAVIRUS DISEASE [COVID-19]), AMPLIFIED PROBE TECHNIQUE, MAKING USE OF HIGH THROUGHPUT TECHNOLOGIES AS DESCRIBED BY CMS-2020-01-R: HCPCS | Performed by: EMERGENCY MEDICINE

## 2020-12-17 DIAGNOSIS — Z23 NEED FOR VACCINATION: ICD-10-CM

## 2021-01-04 ENCOUNTER — PATIENT MESSAGE (OUTPATIENT)
Dept: MEDICAL GROUP | Age: 26
End: 2021-01-04

## 2021-01-04 DIAGNOSIS — Z30.09 VASECTOMY EVALUATION: ICD-10-CM

## 2021-01-04 NOTE — PATIENT COMMUNICATION
1. Caller Name: Demar Gomez                        Call Back Number: MyChart      How would the patient prefer to be contacted with a response: MyChart message    2. SPECIFIC Action To Be Taken: Orders pending, please sign.    3. Diagnosis/Clinical Reason for Request: Vasectomy Evaluation    4. Specialty & Provider Name/Lab/Imaging Location: Urology    5. Is appointment scheduled for requested order/referral: no    Patient was informed they will receive a return phone call from the office ONLY if there are any questions before processing their request. Advised to call back if they haven't received a call from the referral department in 5 days.

## 2021-01-05 NOTE — TELEPHONE ENCOUNTER
From: Demar Gomez  To: Kaz Ford M.D.  Sent: 1/4/2021 12:05 PM PST  Subject: Non-Urgent Medical Question    Christa Ford    I was reaching out hoping I could get a referral to a urologist for a vasectomy I have two children and prefer to practice safe sex from this point forward

## 2021-01-19 DIAGNOSIS — F32.4 MAJOR DEPRESSIVE DISORDER WITH SINGLE EPISODE, IN PARTIAL REMISSION (HCC): ICD-10-CM

## 2021-01-20 RX ORDER — SERTRALINE HYDROCHLORIDE 100 MG/1
TABLET, FILM COATED ORAL
Qty: 90 TAB | Refills: 0 | Status: SHIPPED | OUTPATIENT
Start: 2021-01-20 | End: 2021-04-23

## 2021-04-16 ENCOUNTER — NON-PROVIDER VISIT (OUTPATIENT)
Dept: OCCUPATIONAL MEDICINE | Facility: CLINIC | Age: 26
End: 2021-04-16

## 2021-04-16 NOTE — PATIENT INSTRUCTIONS
Patient understands that if at any point they no longer have facial hair, they are required to be fit tested to the N-95 mask and to call [facility] at [phone] to schedule this appointment.

## 2021-04-23 DIAGNOSIS — F32.4 MAJOR DEPRESSIVE DISORDER WITH SINGLE EPISODE, IN PARTIAL REMISSION (HCC): ICD-10-CM

## 2021-04-23 RX ORDER — SERTRALINE HYDROCHLORIDE 100 MG/1
TABLET, FILM COATED ORAL
Qty: 90 TABLET | Refills: 0 | Status: SHIPPED | OUTPATIENT
Start: 2021-04-23 | End: 2021-09-09

## 2021-06-16 ENCOUNTER — PATIENT MESSAGE (OUTPATIENT)
Dept: MEDICAL GROUP | Age: 26
End: 2021-06-16

## 2021-06-16 DIAGNOSIS — Z00.00 ANNUAL PHYSICAL EXAM: ICD-10-CM

## 2021-06-17 NOTE — TELEPHONE ENCOUNTER
From: Demar Gomez  To: Physician Kaz Ford  Sent: 6/16/2021 8:38 PM PDT  Subject: Non-Urgent Medical Question    Hello Dr Ford,    I recently went our of the network for dental work and they told me they need to do a root canal procedure so it's sort of urgent however they needed me to pay almost 1500 out of pocket along with my insurance I'm not sure if that's the best price or if you are able to recommend me to a Dentist that is more affordable on my care plan

## 2021-06-17 NOTE — PATIENT COMMUNICATION
1. Caller Name: Demar Gomez                        Call Back Number: [phone] (home)       How would the patient prefer to be contacted with a response: MyChart message    2. SPECIFIC Action To Be Taken: Referral pending, please sign.    3. Diagnosis/Clinical Reason for Request: Root Canal     4. Specialty & Provider Name/Lab/Imaging Location: Dentist     5. Is appointment scheduled for requested order/referral: no    Patient was not informed they will receive a return phone call from the office ONLY if there are any questions before processing their request. Advised to call back if they haven't received a call from the referral department in 5 days.

## 2021-09-01 ENCOUNTER — EH NON-PROVIDER (OUTPATIENT)
Dept: OCCUPATIONAL MEDICINE | Facility: CLINIC | Age: 26
End: 2021-09-01

## 2021-09-01 ENCOUNTER — HOSPITAL ENCOUNTER (OUTPATIENT)
Facility: MEDICAL CENTER | Age: 26
End: 2021-09-01
Attending: NURSE PRACTITIONER
Payer: COMMERCIAL

## 2021-09-01 DIAGNOSIS — Z11.59 ENCOUNTER FOR SCREENING FOR OTHER VIRAL DISEASES: ICD-10-CM

## 2021-09-01 LAB — COVID ORDER STATUS COVID19: NORMAL

## 2021-09-01 PROCEDURE — U0003 INFECTIOUS AGENT DETECTION BY NUCLEIC ACID (DNA OR RNA); SEVERE ACUTE RESPIRATORY SYNDROME CORONAVIRUS 2 (SARS-COV-2) (CORONAVIRUS DISEASE [COVID-19]), AMPLIFIED PROBE TECHNIQUE, MAKING USE OF HIGH THROUGHPUT TECHNOLOGIES AS DESCRIBED BY CMS-2020-01-R: HCPCS | Performed by: NURSE PRACTITIONER

## 2021-09-02 LAB
SARS-COV-2 RNA RESP QL NAA+PROBE: NOTDETECTED
SPECIMEN SOURCE: NORMAL

## 2021-09-09 DIAGNOSIS — F32.4 MAJOR DEPRESSIVE DISORDER WITH SINGLE EPISODE, IN PARTIAL REMISSION (HCC): ICD-10-CM

## 2021-09-09 RX ORDER — SERTRALINE HYDROCHLORIDE 100 MG/1
TABLET, FILM COATED ORAL
Qty: 90 TABLET | Refills: 0 | Status: SHIPPED | OUTPATIENT
Start: 2021-09-09 | End: 2021-10-05 | Stop reason: SDUPTHER

## 2021-09-14 RX ORDER — HYDROXYZINE HYDROCHLORIDE 25 MG/1
TABLET, FILM COATED ORAL
Qty: 90 TABLET | Refills: 0 | Status: SHIPPED | OUTPATIENT
Start: 2021-09-14 | End: 2021-12-15

## 2021-10-05 DIAGNOSIS — F32.4 MAJOR DEPRESSIVE DISORDER WITH SINGLE EPISODE, IN PARTIAL REMISSION (HCC): ICD-10-CM

## 2021-10-05 RX ORDER — SERTRALINE HYDROCHLORIDE 100 MG/1
100 TABLET, FILM COATED ORAL
Qty: 90 TABLET | Refills: 0 | Status: SHIPPED | OUTPATIENT
Start: 2021-10-05 | End: 2022-07-08

## 2021-11-12 PROCEDURE — RXMED WILLOW AMBULATORY MEDICATION CHARGE: Performed by: INTERNAL MEDICINE

## 2021-11-13 ENCOUNTER — PHARMACY VISIT (OUTPATIENT)
Dept: PHARMACY | Facility: MEDICAL CENTER | Age: 26
End: 2021-11-13
Payer: COMMERCIAL

## 2021-12-13 ENCOUNTER — PHARMACY VISIT (OUTPATIENT)
Dept: PHARMACY | Facility: MEDICAL CENTER | Age: 26
End: 2021-12-13
Payer: COMMERCIAL

## 2021-12-13 PROCEDURE — RXMED WILLOW AMBULATORY MEDICATION CHARGE: Performed by: INTERNAL MEDICINE

## 2021-12-13 RX ORDER — RNA INGREDIENT BNT-162B2 0.23 G/1.8ML
INJECTION, SUSPENSION INTRAMUSCULAR
Qty: 0.3 ML | Refills: 0 | Status: SHIPPED | OUTPATIENT
Start: 2021-12-13 | End: 2021-12-15

## 2021-12-15 ENCOUNTER — HOSPITAL ENCOUNTER (OUTPATIENT)
Facility: MEDICAL CENTER | Age: 26
End: 2021-12-15
Attending: NURSE PRACTITIONER
Payer: COMMERCIAL

## 2021-12-15 ENCOUNTER — OFFICE VISIT (OUTPATIENT)
Dept: URGENT CARE | Facility: CLINIC | Age: 26
End: 2021-12-15
Payer: COMMERCIAL

## 2021-12-15 VITALS
BODY MASS INDEX: 41.44 KG/M2 | WEIGHT: 264 LBS | HEART RATE: 98 BPM | DIASTOLIC BLOOD PRESSURE: 78 MMHG | OXYGEN SATURATION: 95 % | HEIGHT: 67 IN | TEMPERATURE: 97.6 F | RESPIRATION RATE: 18 BRPM | SYSTOLIC BLOOD PRESSURE: 120 MMHG

## 2021-12-15 DIAGNOSIS — R52 BODY ACHES: ICD-10-CM

## 2021-12-15 DIAGNOSIS — J03.90 EXUDATIVE TONSILLITIS: ICD-10-CM

## 2021-12-15 LAB
INT CON NEG: NEGATIVE
INT CON POS: POSITIVE
S PYO AG THROAT QL: NEGATIVE

## 2021-12-15 PROCEDURE — 87070 CULTURE OTHR SPECIMN AEROBIC: CPT

## 2021-12-15 PROCEDURE — 99213 OFFICE O/P EST LOW 20 MIN: CPT | Performed by: NURSE PRACTITIONER

## 2021-12-15 PROCEDURE — 87880 STREP A ASSAY W/OPTIC: CPT | Performed by: NURSE PRACTITIONER

## 2021-12-15 PROCEDURE — 87077 CULTURE AEROBIC IDENTIFY: CPT

## 2021-12-15 RX ORDER — BUPROPION HYDROCHLORIDE 150 MG/1
TABLET, EXTENDED RELEASE ORAL
COMMUNITY
Start: 2019-07-17 | End: 2022-07-08

## 2021-12-15 ASSESSMENT — ENCOUNTER SYMPTOMS
CHILLS: 1
COUGH: 0
MYALGIAS: 0
DIZZINESS: 0
SORE THROAT: 1
SHORTNESS OF BREATH: 0
VOMITING: 0
NAUSEA: 0
EYE PAIN: 0
ABDOMINAL PAIN: 0
HEADACHES: 1
RHINORRHEA: 1
FEVER: 1

## 2021-12-15 ASSESSMENT — FIBROSIS 4 INDEX: FIB4 SCORE: 0.24

## 2021-12-15 NOTE — PROGRESS NOTES
Subjective:   Demar Gomez is a 26 y.o. male who presents for Medication Reaction (2nd dose Vaccine Pfizer 12/13, body aches, fever, headache, sore throat. (close contact strep +))      URI   This is a new problem. Episode onset: 2 days; recevied 2nd dose of COVID vccine 12/13, daughter was positive Strep 2 weeks ago. The problem has been gradually worsening. The maximum temperature recorded prior to his arrival was 100.4 - 100.9 F. The fever has been present for less than 1 day. Associated symptoms include headaches, rhinorrhea and a sore throat. Pertinent negatives include no abdominal pain, chest pain, congestion, coughing, ear pain, nausea, plugged ear sensation, rash or vomiting. He has tried acetaminophen for the symptoms. The treatment provided no relief.       Review of Systems   Constitutional: Positive for chills, fever and malaise/fatigue.   HENT: Positive for rhinorrhea and sore throat. Negative for congestion and ear pain.    Eyes: Negative for pain.   Respiratory: Negative for cough and shortness of breath.    Cardiovascular: Negative for chest pain.   Gastrointestinal: Negative for abdominal pain, nausea and vomiting.   Genitourinary: Negative for hematuria.   Musculoskeletal: Negative for myalgias.   Skin: Negative for rash.   Neurological: Positive for headaches. Negative for dizziness.       Medications:    • sertraline Tabs    Allergies: Patient has no known allergies.    Problem List: Demar Gomez does not have any pertinent problems on file.    Surgical History:  No past surgical history on file.    Past Social Hx: eDmar Gomez  reports that he quit smoking about 5 years ago. He has a 1.00 pack-year smoking history. He has never used smokeless tobacco. He reports current alcohol use of about 1.2 oz of alcohol per week. He reports that he does not use drugs.     Past Family Hx:  Demar Gomez family history includes Anxiety disorder in his mother; Cancer  "in his paternal grandmother.     Problem list, medications, and allergies reviewed by myself today in Epic.     Objective:     /78   Pulse 98   Temp 36.4 °C (97.6 °F)   Resp 18   Ht 1.702 m (5' 7\")   Wt 120 kg (264 lb)   SpO2 95%   BMI 41.35 kg/m²     Physical Exam  Vitals and nursing note reviewed.   Constitutional:       General: He is not in acute distress.     Appearance: He is well-developed.   HENT:      Head: Normocephalic and atraumatic.      Right Ear: Tympanic membrane and external ear normal.      Left Ear: Tympanic membrane and external ear normal.      Nose: Nose normal.      Right Sinus: No maxillary sinus tenderness or frontal sinus tenderness.      Left Sinus: No maxillary sinus tenderness or frontal sinus tenderness.      Mouth/Throat:      Mouth: Mucous membranes are moist.      Pharynx: Uvula midline. Posterior oropharyngeal erythema present.      Tonsils: Tonsillar exudate present. No tonsillar abscesses. 2+ on the right.   Eyes:      General:         Right eye: No discharge.         Left eye: No discharge.      Conjunctiva/sclera: Conjunctivae normal.   Cardiovascular:      Rate and Rhythm: Normal rate.   Pulmonary:      Effort: Pulmonary effort is normal. No respiratory distress.      Breath sounds: Normal breath sounds.   Abdominal:      General: There is no distension.   Musculoskeletal:         General: Normal range of motion.   Lymphadenopathy:      Cervical: Cervical adenopathy present.   Skin:     General: Skin is warm and dry.   Neurological:      General: No focal deficit present.      Mental Status: He is alert and oriented to person, place, and time. Mental status is at baseline.      Gait: Gait (gait at baseline) normal.   Psychiatric:         Judgment: Judgment normal.         Assessment/Plan:     Diagnosis and associated orders:     1. Exudative tonsillitis  POCT Rapid Strep A    CULTURE THROAT   2. Body aches  CULTURE THROAT      Comments/MDM:     • Strep " negative    Patient is a 26-year-old male present with the stated above, patient with acute illness with systemic symptoms, he does have tonsillar exudate and adenopathy, will follow up with pending throat culture and treat as indicated. Patient did recently receive his second dose of Covid 19 vaccine which she is likely experiencing secondary side effects to the vaccination. Recommended symptom supportive care, Tylenol, fluids, rest.  Differential diagnosis, natural history, supportive care, and indications for immediate follow-up discussed.            Please note that this dictation was created using voice recognition software. I have made a reasonable attempt to correct obvious errors, but I expect that there are errors of grammar and possibly content that I did not discover before finalizing the note.    This note was electronically signed by Mike ART.

## 2021-12-15 NOTE — LETTER
December 15, 2021         Patient: Demar Gomez   YOB: 1995   Date of Visit: 12/15/2021           To Whom it May Concern:    Demar Gomez was seen in my clinic on 12/15/2021. He may return to work on 12/17/21, if afebrile.    If you have any questions or concerns, please don't hesitate to call.        Sincerely,           BREANN Mcfarland.  Electronically Signed

## 2021-12-15 NOTE — LETTER
December 15, 2021         Patient: Demar Gomez   YOB: 1995   Date of Visit: 12/15/2021           To Whom it May Concern:    Demar Gomez was seen in my clinic on 12/15/2021. He may return to work on 12/17/21, if afebrile for 24 hrs and COVID testing is negative.    If you have any questions or concerns, please don't hesitate to call.        Sincerely,           BREANN Mcfarland.  Electronically Signed

## 2021-12-17 RX ORDER — HYDROXYZINE HYDROCHLORIDE 25 MG/1
TABLET, FILM COATED ORAL
Qty: 90 TABLET | Refills: 0 | Status: SHIPPED | OUTPATIENT
Start: 2021-12-17 | End: 2022-07-08

## 2021-12-18 DIAGNOSIS — J02.0 STREP THROAT: ICD-10-CM

## 2021-12-18 LAB
BACTERIA SPEC RESP CULT: ABNORMAL
BACTERIA SPEC RESP CULT: ABNORMAL
SIGNIFICANT IND 70042: ABNORMAL
SITE SITE: ABNORMAL
SOURCE SOURCE: ABNORMAL

## 2021-12-18 RX ORDER — AMOXICILLIN 500 MG/1
500 CAPSULE ORAL 2 TIMES DAILY
Qty: 20 CAPSULE | Refills: 0 | Status: SHIPPED | OUTPATIENT
Start: 2021-12-18 | End: 2021-12-28

## 2022-03-16 ENCOUNTER — APPOINTMENT (OUTPATIENT)
Dept: RADIOLOGY | Facility: MEDICAL CENTER | Age: 27
End: 2022-03-16
Attending: EMERGENCY MEDICINE
Payer: COMMERCIAL

## 2022-03-16 ENCOUNTER — HOSPITAL ENCOUNTER (EMERGENCY)
Facility: MEDICAL CENTER | Age: 27
End: 2022-03-16
Attending: EMERGENCY MEDICINE
Payer: COMMERCIAL

## 2022-03-16 VITALS
SYSTOLIC BLOOD PRESSURE: 112 MMHG | HEIGHT: 67 IN | OXYGEN SATURATION: 98 % | WEIGHT: 260 LBS | HEART RATE: 72 BPM | DIASTOLIC BLOOD PRESSURE: 57 MMHG | TEMPERATURE: 97.9 F | RESPIRATION RATE: 18 BRPM | BODY MASS INDEX: 40.81 KG/M2

## 2022-03-16 DIAGNOSIS — R07.9 ACUTE CHEST PAIN: ICD-10-CM

## 2022-03-16 LAB
ALBUMIN SERPL BCP-MCNC: 4.7 G/DL (ref 3.2–4.9)
ALBUMIN/GLOB SERPL: 1.7 G/DL
ALP SERPL-CCNC: 110 U/L (ref 30–99)
ALT SERPL-CCNC: 28 U/L (ref 2–50)
ANION GAP SERPL CALC-SCNC: 13 MMOL/L (ref 7–16)
AST SERPL-CCNC: 26 U/L (ref 12–45)
BASOPHILS # BLD AUTO: 0.8 % (ref 0–1.8)
BASOPHILS # BLD: 0.1 K/UL (ref 0–0.12)
BILIRUB SERPL-MCNC: 0.3 MG/DL (ref 0.1–1.5)
BUN SERPL-MCNC: 12 MG/DL (ref 8–22)
CALCIUM SERPL-MCNC: 9.2 MG/DL (ref 8.5–10.5)
CHLORIDE SERPL-SCNC: 103 MMOL/L (ref 96–112)
CO2 SERPL-SCNC: 23 MMOL/L (ref 20–33)
CREAT SERPL-MCNC: 0.75 MG/DL (ref 0.5–1.4)
D DIMER PPP IA.FEU-MCNC: <0.27 UG/ML (FEU) (ref 0–0.5)
EKG IMPRESSION: NORMAL
EOSINOPHIL # BLD AUTO: 0.31 K/UL (ref 0–0.51)
EOSINOPHIL NFR BLD: 2.5 % (ref 0–6.9)
ERYTHROCYTE [DISTWIDTH] IN BLOOD BY AUTOMATED COUNT: 41.6 FL (ref 35.9–50)
GFR SERPLBLD CREATININE-BSD FMLA CKD-EPI: 129 ML/MIN/1.73 M 2
GLOBULIN SER CALC-MCNC: 2.7 G/DL (ref 1.9–3.5)
GLUCOSE SERPL-MCNC: 81 MG/DL (ref 65–99)
HCT VFR BLD AUTO: 47.2 % (ref 42–52)
HGB BLD-MCNC: 16.4 G/DL (ref 14–18)
IMM GRANULOCYTES # BLD AUTO: 0.11 K/UL (ref 0–0.11)
IMM GRANULOCYTES NFR BLD AUTO: 0.9 % (ref 0–0.9)
LIPASE SERPL-CCNC: 31 U/L (ref 11–82)
LYMPHOCYTES # BLD AUTO: 4.01 K/UL (ref 1–4.8)
LYMPHOCYTES NFR BLD: 32 % (ref 22–41)
MCH RBC QN AUTO: 29.4 PG (ref 27–33)
MCHC RBC AUTO-ENTMCNC: 34.7 G/DL (ref 33.7–35.3)
MCV RBC AUTO: 84.7 FL (ref 81.4–97.8)
MONOCYTES # BLD AUTO: 0.93 K/UL (ref 0–0.85)
MONOCYTES NFR BLD AUTO: 7.4 % (ref 0–13.4)
NEUTROPHILS # BLD AUTO: 7.07 K/UL (ref 1.82–7.42)
NEUTROPHILS NFR BLD: 56.4 % (ref 44–72)
NRBC # BLD AUTO: 0 K/UL
NRBC BLD-RTO: 0 /100 WBC
PLATELET # BLD AUTO: 356 K/UL (ref 164–446)
PMV BLD AUTO: 8.8 FL (ref 9–12.9)
POTASSIUM SERPL-SCNC: 4.1 MMOL/L (ref 3.6–5.5)
PROT SERPL-MCNC: 7.4 G/DL (ref 6–8.2)
RBC # BLD AUTO: 5.57 M/UL (ref 4.7–6.1)
SODIUM SERPL-SCNC: 139 MMOL/L (ref 135–145)
TROPONIN T SERPL-MCNC: 6 NG/L (ref 6–19)
WBC # BLD AUTO: 12.5 K/UL (ref 4.8–10.8)

## 2022-03-16 PROCEDURE — 85379 FIBRIN DEGRADATION QUANT: CPT

## 2022-03-16 PROCEDURE — 36415 COLL VENOUS BLD VENIPUNCTURE: CPT

## 2022-03-16 PROCEDURE — 94760 N-INVAS EAR/PLS OXIMETRY 1: CPT

## 2022-03-16 PROCEDURE — 85025 COMPLETE CBC W/AUTO DIFF WBC: CPT

## 2022-03-16 PROCEDURE — 99283 EMERGENCY DEPT VISIT LOW MDM: CPT

## 2022-03-16 PROCEDURE — 71045 X-RAY EXAM CHEST 1 VIEW: CPT

## 2022-03-16 PROCEDURE — 84484 ASSAY OF TROPONIN QUANT: CPT

## 2022-03-16 PROCEDURE — 83690 ASSAY OF LIPASE: CPT

## 2022-03-16 PROCEDURE — 80053 COMPREHEN METABOLIC PANEL: CPT

## 2022-03-16 PROCEDURE — 93005 ELECTROCARDIOGRAM TRACING: CPT | Performed by: EMERGENCY MEDICINE

## 2022-03-16 ASSESSMENT — FIBROSIS 4 INDEX: FIB4 SCORE: 0.24

## 2022-03-17 NOTE — ED PROVIDER NOTES
ED Provider Note    CHIEF COMPLAINT  Chief Complaint   Patient presents with   • Chest Pain     Pt reports intermittent chest pain yesterday. Denies these symptoms at this time. Now reporting left forearm tingling.        HPI    Primary care provider: Kaz Ford M.D.   History obtained from: Patient  History limited by: None     Marie Shelley is a 26 y.o. 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  The initial episode lasted about 30 to 45 minutes and resolved.  He went home and had another episode that lasted for a short while prior to going to sleep.  When he woke up the symptoms resolved.  He was working today when he noticed the symptoms returning at around 3:00 this afternoon.  He reports some numbness and tingling and perhaps slight weakness in his left arm.  He also reports that he felt like his heart might be beating irregular.  He has noticed increased shortness of breath with exertion recently.  He denies radiation of pain or pain elsewhere.  He denies fever/cough/dizziness/nausea/vomiting/diarrhea/constipation/dysuria/rash/edema.  He denies known history of heart problems or blood clots.  He reports a grandfather with perhaps heart problems due to drug use.    REVIEW OF SYSTEMS  Please see HPI for pertinent positives/negatives.  All other systems reviewed and are negative.     PAST MEDICAL HISTORY  No past medical history on file.     SURGICAL HISTORY  History reviewed. No pertinent surgical history.     SOCIAL HISTORY  Social History     Tobacco Use   • Smoking status: Former Smoker     Packs/day: 0.50     Years: 2.00     Pack years: 1.00     Quit date: 2016     Years since quittin.2   • Smokeless tobacco: Never Used   Vaping Use   • Vaping Use: Former   Substance and Sexual Activity   • Alcohol use: Yes     Alcohol/week: 1.2 oz     Types: 2 Cans of  "beer per week   • Drug use: No   • Sexual activity: Yes     Partners: Female        FAMILY HISTORY  Family History   Problem Relation Age of Onset   • Anxiety disorder Mother    • Cancer Paternal Grandmother         CURRENT MEDICATIONS  Home Medications    **Home medications have not yet been reviewed for this encounter**          ALLERGIES  No Known Allergies     PHYSICAL EXAM  VITAL SIGNS: /57   Pulse 72   Temp 36.6 °C (97.9 °F) (Temporal)   Resp 18   Ht 1.702 m (5' 7\")   Wt 118 kg (260 lb)   SpO2 98%   BMI 40.72 kg/m²  @ABRAHAM[[id number]::@     Pulse ox interpretation: 99% I interpret this pulse ox as normal     Cardiac monitor interpretation: Sinus rhythm with heart rate in the 70s as interpreted by me.  The patient presented with chest pain and cardiac monitor was ordered to monitor for dysrhythmia.    Constitutional: Well developed, well nourished, alert in no apparent distress, nontoxic appearance    HENT: No external signs of trauma, normocephalic, oropharynx moist and clear, nose normal    Eyes: PERRL, EOMI, vision and visual fields are grossly intact bilaterally, conjunctiva without erythema, no discharge, no icterus    Neck: Soft and supple, trachea midline, no stridor, no tenderness, no LAD, no JVD, good ROM    Cardiovascular: Regular rate and rhythm, no murmurs/rubs/gallops, strong distal pulses and good perfusion    Thorax & Lungs: No respiratory distress, CTAB   Abdomen: Soft, nontender, nondistended, no guarding, no rebound, normal BS    Back: No CVAT    Extremities: No cyanosis, no edema, no gross deformity, good ROM, no tenderness, intact distal pulses with brisk cap refill    Skin: Warm, dry, no pallor/cyanosis, no rash noted    Lymphatic: No lymphadenopathy noted    Neuro: Alert and oriented to person, place, and time.  GCS 15.  CN II-XII grossly intact.  Normal speech.  Equal strength bilateral UE/LE.  Sensation intact to touch.  No cerebellar signs.  Normal gait.    Psychiatric: " Cooperative, normal mood and affect, normal judgement, appropriate for clinical situation        DIAGNOSTIC STUDIES / PROCEDURES    EKG  12 Lead EKG obtained at 1911 and interpreted by me:   Rate: 71   Rhythm: Sinus rhythm   Ectopy: None  Intervals: Normal   Axis: Normal   QRS: Normal   ST segments: Normal  T Waves: Normal    Clinical Impression: Sinus rhythm with no acute ischemic changes or dysrhythmia       LABS  All labs reviewed by me.     Results for orders placed or performed during the hospital encounter of 03/16/22   CBC WITH DIFFERENTIAL   Result Value Ref Range    WBC 12.5 (H) 4.8 - 10.8 K/uL    RBC 5.57 4.70 - 6.10 M/uL    Hemoglobin 16.4 14.0 - 18.0 g/dL    Hematocrit 47.2 42.0 - 52.0 %    MCV 84.7 81.4 - 97.8 fL    MCH 29.4 27.0 - 33.0 pg    MCHC 34.7 33.7 - 35.3 g/dL    RDW 41.6 35.9 - 50.0 fL    Platelet Count 356 164 - 446 K/uL    MPV 8.8 (L) 9.0 - 12.9 fL    Neutrophils-Polys 56.40 44.00 - 72.00 %    Lymphocytes 32.00 22.00 - 41.00 %    Monocytes 7.40 0.00 - 13.40 %    Eosinophils 2.50 0.00 - 6.90 %    Basophils 0.80 0.00 - 1.80 %    Immature Granulocytes 0.90 0.00 - 0.90 %    Nucleated RBC 0.00 /100 WBC    Neutrophils (Absolute) 7.07 1.82 - 7.42 K/uL    Lymphs (Absolute) 4.01 1.00 - 4.80 K/uL    Monos (Absolute) 0.93 (H) 0.00 - 0.85 K/uL    Eos (Absolute) 0.31 0.00 - 0.51 K/uL    Baso (Absolute) 0.10 0.00 - 0.12 K/uL    Immature Granulocytes (abs) 0.11 0.00 - 0.11 K/uL    NRBC (Absolute) 0.00 K/uL   COMP METABOLIC PANEL   Result Value Ref Range    Sodium 139 135 - 145 mmol/L    Potassium 4.1 3.6 - 5.5 mmol/L    Chloride 103 96 - 112 mmol/L    Co2 23 20 - 33 mmol/L    Anion Gap 13.0 7.0 - 16.0    Glucose 81 65 - 99 mg/dL    Bun 12 8 - 22 mg/dL    Creatinine 0.75 0.50 - 1.40 mg/dL    Calcium 9.2 8.5 - 10.5 mg/dL    AST(SGOT) 26 12 - 45 U/L    ALT(SGPT) 28 2 - 50 U/L    Alkaline Phosphatase 110 (H) 30 - 99 U/L    Total Bilirubin 0.3 0.1 - 1.5 mg/dL    Albumin 4.7 3.2 - 4.9 g/dL    Total Protein 7.4  6.0 - 8.2 g/dL    Globulin 2.7 1.9 - 3.5 g/dL    A-G Ratio 1.7 g/dL   LIPASE   Result Value Ref Range    Lipase 31 11 - 82 U/L   TROPONIN   Result Value Ref Range    Troponin T 6 6 - 19 ng/L   D-DIMER   Result Value Ref Range    D-Dimer Screen <0.27 0.00 - 0.50 ug/mL (FEU)   ESTIMATED GFR   Result Value Ref Range    GFR (CKD-EPI) 129 >60 mL/min/1.73 m 2   EKG   Result Value Ref Range    Report       [facility] Emergency Dept[facility]    Test Date:  2022  Pt Name:    BIBI EDWILCOX              Department: ER  MRN:        [id number]                      Room:       [facility]  Gender:     M                            Technician: LETI  :        1995                   Requested By:ER TRIAGE PROTOCOL  Order #:    [id number]                    Reading MD:    Measurements  Intervals                                Axis  Rate:       71                           P:          26  MN:         132                          QRS:        11  QRSD:       80                           T:          26  QT:         392  QTc:        426    Interpretive Statements  SINUS RHYTHM  No previous ECG available for comparison          RADIOLOGY  The radiologist's interpretation of all radiological studies have been reviewed by me.     DX-CHEST-PORTABLE (1 VIEW)   Final Result      1.  There is no acute cardiopulmonary process.             COURSE & MEDICAL DECISION MAKING  Nursing notes, VS, PMSFHx reviewed in chart.     Review of past medical records shows the patient was last seen at urgent care on 2021 for possible exposure to strep throat.      Differential diagnoses considered include but are not limited to: AMI, dissection, PE, pneumothorax, pneumomediastinum, CHF/pulm edema, pericardial effusion/tamponade, myocarditis, pericarditis, pleural effusion, pleurisy, costochondritis, mediastinitis, esophageal spasm, GERD, gastritis, PUD, hiatal hernia, pancreatitis, cholecystitis/ascending cholangitis,  gallstone/biliary colic, muscle strain, neuropathy       Pt risk-stratified as low risk for MACE in the next 6 weeks by HEART Score (0-3): 1    HISTORY  Highly suspicious +2  Moderately suspicious +1  Slightly suspicious 0    EKG  Significant ST depression +2  Nonspecific repolarization disturbance +1  Normal 0    AGE  ? 65 +2  45-65 +1  < 45 0    RISK FACTORS  Hypercholesterolemia, HTN, DM, Cigarette smoking, positive family history, obesity  ? 3 risk factors or history of atherosclerotic disease +2  1-2 risk factors +1  No risk factors known 0    TROPONIN  ? 3× normal limit +2  1-3× normal limit +1  ? normal limit 0      History and physical exam as above.  EKG and chest x-ray without acute findings.  Labs are fairly unremarkable except for mild leukocytosis likely stress reaction.  Findings discussed with patient.  This is a pleasant well-appearing patient in no acute distress and nontoxic in appearance.  His exam is benign and he has been clinically stable in the ED.  At this point, overall clinical picture does not suggest emergent pathology.  He was advised on outpatient follow-up and given return to ED precautions.  He verbalized understanding and agreed with plan of care with no further questions or concerns.      The patient is referred to a primary physician for blood pressure management, diabetic screening, and for all other preventative health concerns.       FINAL IMPRESSION  1. Acute chest pain Acute          DISPOSITION  Patient will be discharged home in stable condition.       FOLLOW UP  Kaz Ford M.D.  [address] [place] [address] Dr Min [place] [id number] [phone]    Call in 1 day      [facility], Emergency Dept  [address] [facility] [address] [phone]    If symptoms worsen         OUTPATIENT MEDICATIONS  Discharge Medication List as of 3/16/2022  8:52 PM             Electronically signed by: Hector Hunter D.O., 3/16/2022 7:16 PM      Portions of this record were made with  voice recognition software.  Despite my review, spelling/grammar/context errors may still remain.  Interpretation of this chart should be taken in this context.

## 2022-07-07 SDOH — ECONOMIC STABILITY: INCOME INSECURITY: IN THE LAST 12 MONTHS, WAS THERE A TIME WHEN YOU WERE NOT ABLE TO PAY THE MORTGAGE OR RENT ON TIME?: NO

## 2022-07-07 SDOH — ECONOMIC STABILITY: FOOD INSECURITY: WITHIN THE PAST 12 MONTHS, YOU WORRIED THAT YOUR FOOD WOULD RUN OUT BEFORE YOU GOT MONEY TO BUY MORE.: SOMETIMES TRUE

## 2022-07-07 SDOH — HEALTH STABILITY: PHYSICAL HEALTH: ON AVERAGE, HOW MANY MINUTES DO YOU ENGAGE IN EXERCISE AT THIS LEVEL?: 20 MIN

## 2022-07-07 SDOH — ECONOMIC STABILITY: FOOD INSECURITY: WITHIN THE PAST 12 MONTHS, THE FOOD YOU BOUGHT JUST DIDN'T LAST AND YOU DIDN'T HAVE MONEY TO GET MORE.: SOMETIMES TRUE

## 2022-07-07 SDOH — HEALTH STABILITY: MENTAL HEALTH
STRESS IS WHEN SOMEONE FEELS TENSE, NERVOUS, ANXIOUS, OR CAN'T SLEEP AT NIGHT BECAUSE THEIR MIND IS TROUBLED. HOW STRESSED ARE YOU?: VERY MUCH

## 2022-07-07 SDOH — ECONOMIC STABILITY: HOUSING INSECURITY
IN THE LAST 12 MONTHS, WAS THERE A TIME WHEN YOU DID NOT HAVE A STEADY PLACE TO SLEEP OR SLEPT IN A SHELTER (INCLUDING NOW)?: NO

## 2022-07-07 SDOH — ECONOMIC STABILITY: TRANSPORTATION INSECURITY
IN THE PAST 12 MONTHS, HAS LACK OF TRANSPORTATION KEPT YOU FROM MEETINGS, WORK, OR FROM GETTING THINGS NEEDED FOR DAILY LIVING?: NO

## 2022-07-07 SDOH — ECONOMIC STABILITY: TRANSPORTATION INSECURITY
IN THE PAST 12 MONTHS, HAS THE LACK OF TRANSPORTATION KEPT YOU FROM MEDICAL APPOINTMENTS OR FROM GETTING MEDICATIONS?: NO

## 2022-07-07 SDOH — HEALTH STABILITY: PHYSICAL HEALTH: ON AVERAGE, HOW MANY DAYS PER WEEK DO YOU ENGAGE IN MODERATE TO STRENUOUS EXERCISE (LIKE A BRISK WALK)?: 5 DAYS

## 2022-07-07 SDOH — ECONOMIC STABILITY: HOUSING INSECURITY: IN THE LAST 12 MONTHS, HOW MANY PLACES HAVE YOU LIVED?: 2

## 2022-07-07 SDOH — ECONOMIC STABILITY: TRANSPORTATION INSECURITY
IN THE PAST 12 MONTHS, HAS LACK OF RELIABLE TRANSPORTATION KEPT YOU FROM MEDICAL APPOINTMENTS, MEETINGS, WORK OR FROM GETTING THINGS NEEDED FOR DAILY LIVING?: NO

## 2022-07-07 SDOH — ECONOMIC STABILITY: INCOME INSECURITY: HOW HARD IS IT FOR YOU TO PAY FOR THE VERY BASICS LIKE FOOD, HOUSING, MEDICAL CARE, AND HEATING?: SOMEWHAT HARD

## 2022-07-07 ASSESSMENT — SOCIAL DETERMINANTS OF HEALTH (SDOH)
ARE YOU MARRIED, WIDOWED, DIVORCED, SEPARATED, NEVER MARRIED, OR LIVING WITH A PARTNER?: LIVING WITH PARTNER
ARE YOU MARRIED, WIDOWED, DIVORCED, SEPARATED, NEVER MARRIED, OR LIVING WITH A PARTNER?: LIVING WITH PARTNER
HOW OFTEN DO YOU ATTEND CHURCH OR RELIGIOUS SERVICES?: PATIENT DECLINED
IN A TYPICAL WEEK, HOW MANY TIMES DO YOU TALK ON THE PHONE WITH FAMILY, FRIENDS, OR NEIGHBORS?: TWICE A WEEK
HOW OFTEN DO YOU HAVE SIX OR MORE DRINKS ON ONE OCCASION: MONTHLY
HOW OFTEN DO YOU ATTENT MEETINGS OF THE CLUB OR ORGANIZATION YOU BELONG TO?: PATIENT DECLINED
HOW OFTEN DO YOU GET TOGETHER WITH FRIENDS OR RELATIVES?: ONCE A WEEK
WITHIN THE PAST 12 MONTHS, YOU WORRIED THAT YOUR FOOD WOULD RUN OUT BEFORE YOU GOT THE MONEY TO BUY MORE: SOMETIMES TRUE
DO YOU BELONG TO ANY CLUBS OR ORGANIZATIONS SUCH AS CHURCH GROUPS UNIONS, FRATERNAL OR ATHLETIC GROUPS, OR SCHOOL GROUPS?: NO
HOW MANY DRINKS CONTAINING ALCOHOL DO YOU HAVE ON A TYPICAL DAY WHEN YOU ARE DRINKING: 5 OR 6
DO YOU BELONG TO ANY CLUBS OR ORGANIZATIONS SUCH AS CHURCH GROUPS UNIONS, FRATERNAL OR ATHLETIC GROUPS, OR SCHOOL GROUPS?: NO
HOW HARD IS IT FOR YOU TO PAY FOR THE VERY BASICS LIKE FOOD, HOUSING, MEDICAL CARE, AND HEATING?: SOMEWHAT HARD
HOW OFTEN DO YOU ATTEND CHURCH OR RELIGIOUS SERVICES?: PATIENT DECLINED
HOW OFTEN DO YOU HAVE A DRINK CONTAINING ALCOHOL: 2-4 TIMES A MONTH
HOW OFTEN DO YOU GET TOGETHER WITH FRIENDS OR RELATIVES?: ONCE A WEEK
IN A TYPICAL WEEK, HOW MANY TIMES DO YOU TALK ON THE PHONE WITH FAMILY, FRIENDS, OR NEIGHBORS?: TWICE A WEEK
HOW OFTEN DO YOU ATTENT MEETINGS OF THE CLUB OR ORGANIZATION YOU BELONG TO?: PATIENT DECLINED

## 2022-07-07 ASSESSMENT — LIFESTYLE VARIABLES
AUDIT-C TOTAL SCORE: 6
HOW MANY STANDARD DRINKS CONTAINING ALCOHOL DO YOU HAVE ON A TYPICAL DAY: 5 OR 6
HOW OFTEN DO YOU HAVE SIX OR MORE DRINKS ON ONE OCCASION: MONTHLY
HOW OFTEN DO YOU HAVE A DRINK CONTAINING ALCOHOL: 2-4 TIMES A MONTH
SKIP TO QUESTIONS 9-10: 0

## 2022-07-08 ENCOUNTER — HOSPITAL ENCOUNTER (OUTPATIENT)
Dept: RADIOLOGY | Facility: MEDICAL CENTER | Age: 27
End: 2022-07-08
Attending: FAMILY MEDICINE
Payer: COMMERCIAL

## 2022-07-08 ENCOUNTER — HOSPITAL ENCOUNTER (OUTPATIENT)
Dept: LAB | Facility: MEDICAL CENTER | Age: 27
End: 2022-07-08
Attending: FAMILY MEDICINE
Payer: COMMERCIAL

## 2022-07-08 ENCOUNTER — OFFICE VISIT (OUTPATIENT)
Dept: MEDICAL GROUP | Age: 27
End: 2022-07-08
Payer: COMMERCIAL

## 2022-07-08 VITALS
OXYGEN SATURATION: 93 % | SYSTOLIC BLOOD PRESSURE: 102 MMHG | WEIGHT: 257.4 LBS | DIASTOLIC BLOOD PRESSURE: 58 MMHG | HEART RATE: 87 BPM | HEIGHT: 67 IN | TEMPERATURE: 98.4 F | BODY MASS INDEX: 40.4 KG/M2

## 2022-07-08 DIAGNOSIS — R20.2 NUMBNESS AND TINGLING: ICD-10-CM

## 2022-07-08 DIAGNOSIS — R20.0 NUMBNESS AND TINGLING: ICD-10-CM

## 2022-07-08 LAB
25(OH)D3 SERPL-MCNC: 25 NG/ML (ref 30–100)
VIT B12 SERPL-MCNC: 613 PG/ML (ref 211–911)

## 2022-07-08 PROCEDURE — 72040 X-RAY EXAM NECK SPINE 2-3 VW: CPT

## 2022-07-08 PROCEDURE — 99214 OFFICE O/P EST MOD 30 MIN: CPT | Performed by: FAMILY MEDICINE

## 2022-07-08 PROCEDURE — 82306 VITAMIN D 25 HYDROXY: CPT

## 2022-07-08 PROCEDURE — 82607 VITAMIN B-12: CPT

## 2022-07-08 PROCEDURE — 86038 ANTINUCLEAR ANTIBODIES: CPT

## 2022-07-08 PROCEDURE — 36415 COLL VENOUS BLD VENIPUNCTURE: CPT

## 2022-07-08 RX ORDER — BUPROPION HYDROCHLORIDE 150 MG/1
150 TABLET, EXTENDED RELEASE ORAL 2 TIMES DAILY
Qty: 60 TABLET | Refills: 0 | Status: SHIPPED | OUTPATIENT
Start: 2022-07-08 | End: 2022-07-22

## 2022-07-08 ASSESSMENT — PATIENT HEALTH QUESTIONNAIRE - PHQ9
9. THOUGHTS THAT YOU WOULD BE BETTER OFF DEAD, OR OF HURTING YOURSELF: NOT AT ALL
7. TROUBLE CONCENTRATING ON THINGS, SUCH AS READING THE NEWSPAPER OR WATCHING TELEVISION: NOT AT ALL
2. FEELING DOWN, DEPRESSED, IRRITABLE, OR HOPELESS: SEVERAL DAYS
4. FEELING TIRED OR HAVING LITTLE ENERGY: NEARLY EVERY DAY
8. MOVING OR SPEAKING SO SLOWLY THAT OTHER PEOPLE COULD HAVE NOTICED. OR THE OPPOSITE, BEING SO FIGETY OR RESTLESS THAT YOU HAVE BEEN MOVING AROUND A LOT MORE THAN USUAL: NOT AT ALL
3. TROUBLE FALLING OR STAYING ASLEEP OR SLEEPING TOO MUCH: NEARLY EVERY DAY
1. LITTLE INTEREST OR PLEASURE IN DOING THINGS: MORE THAN HALF THE DAYS
6. FEELING BAD ABOUT YOURSELF - OR THAT YOU ARE A FAILURE OR HAVE LET YOURSELF OR YOUR FAMILY DOWN: NEARLY EVERY DAY
5. POOR APPETITE OR OVEREATING: NOT AT ALL
SUM OF ALL RESPONSES TO PHQ QUESTIONS 1-9: 12
SUM OF ALL RESPONSES TO PHQ9 QUESTIONS 1 AND 2: 3

## 2022-07-08 ASSESSMENT — FIBROSIS 4 INDEX: FIB4 SCORE: 0.35

## 2022-07-08 NOTE — PROGRESS NOTES
This medical record contains text that has been entered with the assistance of computer voice recognition and dictation software.  Therefore, it may contain unintended errors in text, spelling, punctuation, or grammar      Chief Complaint   Patient presents with   • Annual Exam   • Tingling     Left fingers x 2 weeks   • Anxiety     Possible medications         Demar Gomez is a 27 y.o. male here evaluation and management of: Numbness and tingling in the left fingers      HPI:     1. Numbness and tingling  NEW UNDIAGNOSED PROBLEM    Donnie is a very pleasant 27-year-old male who presents to clinic with a chief complaint of having numbness and tingling in the fingers of the left hand.  He states it started about a week ago its episodic last for seconds.  He denies any trauma to the neck no trauma to the shoulder or arm at all.  He did wean himself off of Wellbutrin and Zoloft about 6 weeks ago.  He states his anxiety has increased and he thinks he needs to get back on an anxiolytic.  He denies any vision changes no weakness in any extremity, no headache no changes in vision no double vision.    Current medicines (including changes today)  Current Outpatient Medications   Medication Sig Dispense Refill   • buPROPion SR (WELLBUTRIN SR) 150 MG TABLET SR 12 HR sustained-release tablet Take 1 Tablet by mouth 2 times a day. 60 Tablet 0     No current facility-administered medications for this visit.     He  has no past medical history on file.  He  has no past surgical history on file.  Social History     Tobacco Use   • Smoking status: Former Smoker     Packs/day: 0.50     Years: 2.00     Pack years: 1.00     Quit date: 2016     Years since quittin.5   • Smokeless tobacco: Never Used   Vaping Use   • Vaping Use: Former   Substance Use Topics   • Alcohol use: Yes     Alcohol/week: 3.6 oz     Types: 4 Cans of beer, 2 Shots of liquor per week     Comment: once a week   • Drug use: Never     Social History  "    Social History Narrative   • Not on file     Family History   Problem Relation Age of Onset   • Anxiety disorder Mother    • Cancer Paternal Grandmother      Family Status   Relation Name Status   • Mo  Alive   • Fa  Alive   • Sis  Alive   • MGMo     • MGFa  Alive   • PGMo  Alive   • PGFa     • Sis  Alive   • Sabrina  Alive         ROS    The pertinent  ROS findings can be seen in the HPI above.     All other systems reviewed and are negative     Objective:     /58 (BP Location: Left arm, Patient Position: Sitting, BP Cuff Size: Adult long)   Pulse 87   Temp 36.9 °C (98.4 °F)   Ht 1.702 m (5' 7\")   Wt 117 kg (257 lb 6.4 oz)   SpO2 93%  Body mass index is 40.31 kg/m².      Physical Exam:    Constitutional: Alert, no distress.  Skin: No suspicious lesions  Eye: Equal, round and reactive, conjunctiva clear, lids normal.  ENMT: Lips without lesions, good dentition, oropharynx clear.  Neck: Trachea midline, no masses, no thyromegaly. No cervical or supraclavicular lymphadenopathy.  Respiratory: Unlabored respiratory effort, lungs clear to auscultation, no wheezes, no ronchi.  Cardiovascular: Normal S1, S2, no murmur, no edema  Abdomen: Soft, non-tender, no masses, no hepatosplenomegaly.    Neuro:  CN II-XII checked and intact, DTRs 2+ throughout, intact sensation to light touch, vibration, normal gait, No focal deficits, Romberg Normal, Normal tandem gait, normal heel to shin, normal finger to nose.   Normal handgrip bilaterally 5 out of 5 strength bilateral upper extremities, normal shoulder shrug, negative Spurling sign.              Assessment and Plan:   The following treatment plan was discussed      1. Numbness and tingling  We will obtain an GLADYS and vitamin B12 levels  Obtain a plain film of the cervical spine    Also restart Wellbutrin for anxiety  We know that SSRIs can cause hyperkinetic muscle activity and spasms.  This could have been withdrawal from the SSRI.    - VITAMIN D,25 " HYDROXY; Future  - VITAMIN B12; Future  - GLADYS REFLEXIVE PROFILE; Future  - DX-CERVICAL SPINE-2 OR 3 VIEWS; Future  - buPROPion SR (WELLBUTRIN SR) 150 MG TABLET SR 12 HR sustained-release tablet; Take 1 Tablet by mouth 2 times a day.  Dispense: 60 Tablet; Refill: 0            Instructed to Follow up in clinic or ER for worsening symptoms, difficulty breathing, lack of expected recovery, or should new symptoms or problems arise.    Followup: Return in about 4 weeks (around 8/5/2022) for Reevaluation, labs.

## 2022-07-12 LAB — NUCLEAR IGG SER QL IA: NORMAL

## 2022-07-22 DIAGNOSIS — R20.0 NUMBNESS AND TINGLING: ICD-10-CM

## 2022-07-22 DIAGNOSIS — R20.2 NUMBNESS AND TINGLING: ICD-10-CM

## 2022-07-22 RX ORDER — BUPROPION HYDROCHLORIDE 150 MG/1
TABLET, EXTENDED RELEASE ORAL
Qty: 180 TABLET | Refills: 1 | Status: SHIPPED | OUTPATIENT
Start: 2022-07-22 | End: 2023-04-25

## 2022-07-22 NOTE — TELEPHONE ENCOUNTER
Received request via: Pharmacy    Was the patient seen in the last year in this department? Yes    Does the patient have an active prescription (recently filled or refills available) for medication(s) requested? No     Pharmacy comment: REQUEST FOR 90 DAYS PRESCRIPTION.

## 2022-10-07 ENCOUNTER — IMMUNIZATION (OUTPATIENT)
Dept: OCCUPATIONAL MEDICINE | Facility: CLINIC | Age: 27
End: 2022-10-07

## 2022-10-07 DIAGNOSIS — Z23 NEED FOR VACCINATION: Primary | ICD-10-CM

## 2022-10-07 PROCEDURE — 90686 IIV4 VACC NO PRSV 0.5 ML IM: CPT | Performed by: NURSE PRACTITIONER

## 2023-04-25 ENCOUNTER — OFFICE VISIT (OUTPATIENT)
Dept: URGENT CARE | Facility: CLINIC | Age: 28
End: 2023-04-25
Payer: COMMERCIAL

## 2023-04-25 VITALS
DIASTOLIC BLOOD PRESSURE: 74 MMHG | RESPIRATION RATE: 16 BRPM | HEIGHT: 67 IN | BODY MASS INDEX: 39.39 KG/M2 | HEART RATE: 59 BPM | TEMPERATURE: 97.7 F | SYSTOLIC BLOOD PRESSURE: 118 MMHG | WEIGHT: 251 LBS | OXYGEN SATURATION: 99 %

## 2023-04-25 DIAGNOSIS — S61.012A LACERATION OF LEFT THUMB WITHOUT FOREIGN BODY WITHOUT DAMAGE TO NAIL, INITIAL ENCOUNTER: ICD-10-CM

## 2023-04-25 PROCEDURE — 90471 IMMUNIZATION ADMIN: CPT | Performed by: PHYSICIAN ASSISTANT

## 2023-04-25 PROCEDURE — 99213 OFFICE O/P EST LOW 20 MIN: CPT | Mod: 25 | Performed by: PHYSICIAN ASSISTANT

## 2023-04-25 PROCEDURE — 90715 TDAP VACCINE 7 YRS/> IM: CPT | Performed by: PHYSICIAN ASSISTANT

## 2023-04-25 ASSESSMENT — ENCOUNTER SYMPTOMS
NAUSEA: 0
SENSORY CHANGE: 0
VOMITING: 0
TINGLING: 0
FOCAL WEAKNESS: 0
CHILLS: 0
FEVER: 0

## 2023-04-25 ASSESSMENT — FIBROSIS 4 INDEX: FIB4 SCORE: 0.36

## 2023-04-25 NOTE — PROGRESS NOTES
"Subjective     Demar Gomez is a 28 y.o. male who presents with Hand Laceration (Pt states would like to receive Tdap. 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 He states the tip of his left thumb was cut by hawa metal. He believes he is due for a TDAP. He cleaned out the wound and the bleeding has stopped.     Immunization records show that last TDAP was in 2010.      No past medical history on file.      No past surgical history on file.      Family History   Problem Relation Age of Onset    Anxiety disorder Mother     Cancer Paternal Grandmother        No Known Allergies        Medications, Allergies, and current problem list reviewed today in Epic      Review of Systems   Constitutional:  Negative for chills and fever.   Gastrointestinal:  Negative for nausea and vomiting.   Musculoskeletal:  Negative for joint pain.   Skin:         Small laceration to left thumb   Neurological:  Negative for tingling, sensory change and focal weakness.        All other systems reviewed and are negative.       Objective     /74   Pulse (!) 59   Temp 36.5 °C (97.7 °F)   Resp 16   Ht 1.702 m (5' 7\")   Wt 114 kg (251 lb)   SpO2 99%   BMI 39.31 kg/m²      Physical Exam  Constitutional:       General: He is not in acute distress.     Appearance: He is not ill-appearing.   HENT:      Head: Normocephalic and atraumatic.   Eyes:      Conjunctiva/sclera: Conjunctivae normal.   Cardiovascular:      Rate and Rhythm: Normal rate and regular rhythm.   Pulmonary:      Effort: Pulmonary effort is normal. No respiratory distress.      Breath sounds: No stridor. No wheezing.   Musculoskeletal:        Hands:    Skin:     General: Skin is warm and dry.   Neurological:      General: No focal deficit present.      Mental Status: He is alert and oriented to person, place, and time.   Psychiatric:         Mood and Affect: Mood normal.         Behavior: Behavior " normal.         Thought Content: Thought content normal.         Judgment: Judgment normal.                           Assessment & Plan        1. Laceration of left thumb without foreign body without damage to nail, initial encounter  Wound closure not needed  Partial thickness.  - Tdap =>6yo IM        TDAP updated.  Wound care discussed.    Differential diagnoses, Supportive care, and indications for immediate follow-up discussed with patient.   Pathogenesis of diagnosis discussed including typical length and natural progression.   Instructed to return to clinic or nearest emergency department for any change in condition, further concerns, or worsening of symptoms.      The patient demonstrated a good understanding and agreed with the treatment plan.      Montse Petty P.A.-C.

## 2025-08-13 DIAGNOSIS — Z00.6 CLINICAL TRIAL PARTICIPANT: ICD-10-CM

## 2025-08-22 ENCOUNTER — APPOINTMENT (OUTPATIENT)
Dept: LAB | Facility: MEDICAL CENTER | Age: 30
End: 2025-08-22
Attending: FAMILY MEDICINE
Payer: COMMERCIAL

## 2025-08-29 ENCOUNTER — HOSPITAL ENCOUNTER (OUTPATIENT)
Dept: LAB | Facility: MEDICAL CENTER | Age: 30
End: 2025-08-29
Attending: FAMILY MEDICINE

## 2025-08-29 DIAGNOSIS — Z00.6 CLINICAL TRIAL PARTICIPANT: ICD-10-CM
